# Patient Record
Sex: FEMALE | Race: WHITE | NOT HISPANIC OR LATINO | Employment: OTHER | URBAN - METROPOLITAN AREA
[De-identification: names, ages, dates, MRNs, and addresses within clinical notes are randomized per-mention and may not be internally consistent; named-entity substitution may affect disease eponyms.]

---

## 2018-05-02 ENCOUNTER — OFFICE VISIT (OUTPATIENT)
Dept: CARDIOLOGY | Facility: CLINIC | Age: 58
End: 2018-05-02

## 2018-05-02 VITALS
HEART RATE: 77 BPM | DIASTOLIC BLOOD PRESSURE: 84 MMHG | WEIGHT: 235 LBS | SYSTOLIC BLOOD PRESSURE: 164 MMHG | BODY MASS INDEX: 46.13 KG/M2 | HEIGHT: 60 IN

## 2018-05-02 DIAGNOSIS — R06.09 DOE (DYSPNEA ON EXERTION): ICD-10-CM

## 2018-05-02 DIAGNOSIS — I10 ESSENTIAL HYPERTENSION: ICD-10-CM

## 2018-05-02 DIAGNOSIS — E78.00 PURE HYPERCHOLESTEROLEMIA: ICD-10-CM

## 2018-05-02 DIAGNOSIS — I25.810 CORONARY ARTERY DISEASE INVOLVING CORONARY BYPASS GRAFT OF NATIVE HEART WITHOUT ANGINA PECTORIS: Primary | ICD-10-CM

## 2018-05-02 PROCEDURE — 99204 OFFICE O/P NEW MOD 45 MIN: CPT | Performed by: INTERNAL MEDICINE

## 2018-05-02 PROCEDURE — 93010 ELECTROCARDIOGRAM REPORT: CPT | Performed by: INTERNAL MEDICINE

## 2018-05-02 RX ORDER — NAPROXEN 500 MG/1
500 TABLET ORAL 2 TIMES DAILY WITH MEALS
COMMUNITY
End: 2019-01-09

## 2018-05-02 RX ORDER — LISINOPRIL 5 MG/1
5 TABLET ORAL DAILY
COMMUNITY
End: 2020-07-15 | Stop reason: SINTOL

## 2018-05-02 RX ORDER — ASPIRIN 81 MG/1
81 TABLET ORAL DAILY
COMMUNITY
End: 2022-03-25 | Stop reason: SDUPTHER

## 2018-05-02 RX ORDER — MELOXICAM 15 MG/1
15 TABLET ORAL DAILY
Status: ON HOLD | COMMUNITY
End: 2020-09-25

## 2018-05-02 RX ORDER — VENLAFAXINE HYDROCHLORIDE 150 MG/1
450 CAPSULE, EXTENDED RELEASE ORAL DAILY
COMMUNITY

## 2018-05-02 RX ORDER — PANTOPRAZOLE SODIUM 40 MG/1
40 TABLET, DELAYED RELEASE ORAL 2 TIMES DAILY
COMMUNITY

## 2018-05-02 RX ORDER — GLIPIZIDE 10 MG/1
10 TABLET ORAL
COMMUNITY

## 2018-05-02 RX ORDER — AMLODIPINE BESYLATE 5 MG/1
10 TABLET ORAL DAILY
COMMUNITY
End: 2022-03-25 | Stop reason: SDUPTHER

## 2018-05-02 RX ORDER — ATORVASTATIN CALCIUM 80 MG/1
80 TABLET, FILM COATED ORAL DAILY
COMMUNITY

## 2018-05-02 RX ORDER — QUETIAPINE FUMARATE 100 MG/1
100 TABLET, FILM COATED ORAL NIGHTLY
Status: ON HOLD | COMMUNITY
End: 2018-06-14

## 2018-05-02 RX ORDER — GABAPENTIN 300 MG/1
300 CAPSULE ORAL 3 TIMES DAILY
COMMUNITY
End: 2019-01-09

## 2018-05-02 NOTE — PROGRESS NOTES
Subjective:     Encounter Date:2018    Primary Care Physician: Santosh Melara DO      Patient ID: Anastasia Villalba is a 58 y.o. female.    Chief Complaint:Chest Pain (re-eval)    PROBLEM LIST:  1. Coronary artery disease:  a. In , cardiac catheterization with multivessel coronary disease, normal LVEF, cardiac catheterization by Dr. Varela.    b. 2008, coronary artery bypass grafting x 4 by Dr. Cowan, incomplete database.   c. Postoperative sternal wound infection (MRSA), status post multiple surgery debridement, as well as eventual partial sternum removal and rib removal x 4.    d. On 2014, abnormal myocardial perfusion study ready by Dr. Tripp Madrid with a small area of possible ischemia in the anterolateral wall, EF of 76%.    e. 2014, echocardiogram with normal LVEF and no significant valvular abnormalities.    f. On 2014, cardiac cath showed all widely patent grafts.  No flow-obstructive disease distal.  Normal LVEF.  2. Hypertension.   3. Dyslipidemia.   4. Diabetes with neuropathy.    5. Sleep apnea on CPAP  6. Depression.   7. GERD.  8. Anxiety.   9. Arthritis.   10. Fibromyalgia.   11. Remote right knee surgery.   12.  x 2.   13. Chronic pain syndrome.   14. Remote tobacco abuse.    15. Carpal tunnel surgery      No Known Allergies      Current Outpatient Prescriptions:   •  amLODIPine (NORVASC) 5 MG tablet, Take 5 mg by mouth Daily., Disp: , Rfl:   •  aspirin 325 MG tablet, Take 325 mg by mouth Daily., Disp: , Rfl:   •  atorvastatin (LIPITOR) 80 MG tablet, Take 80 mg by mouth Daily., Disp: , Rfl:   •  gabapentin (NEURONTIN) 300 MG capsule, Take 300 mg by mouth 3 (Three) Times a Day., Disp: , Rfl:   •  glipiZIDE (GLUCOTROL) 10 MG tablet, Take 10 mg by mouth 2 (Two) Times a Day Before Meals., Disp: , Rfl:   •  lisinopril (PRINIVIL,ZESTRIL) 5 MG tablet, Take 5 mg by mouth Daily., Disp: , Rfl:   •  meloxicam (MOBIC) 15 MG tablet, Take 15 mg by mouth  Daily., Disp: , Rfl:   •  metFORMIN (GLUCOPHAGE) 1000 MG tablet, Take 1,000 mg by mouth 2 (Two) Times a Day With Meals., Disp: , Rfl:   •  metoprolol tartrate (LOPRESSOR) 25 MG tablet, Take 25 mg by mouth 2 (Two) Times a Day., Disp: , Rfl:   •  pantoprazole (PROTONIX) 40 MG EC tablet, Take 40 mg by mouth 2 (Two) Times a Day., Disp: , Rfl:   •  QUEtiapine (SEROquel) 100 MG tablet, Take 100 mg by mouth Every Night., Disp: , Rfl:   •  venlafaxine XR (EFFEXOR-XR) 150 MG 24 hr capsule, Take 300 mg by mouth Daily., Disp: , Rfl:         History of Present Illness    Patient presents today for reevaluation of coronary artery disease, hypertension, and dyslipidemia. She recently saw her primary care and was discussing possible use of diet pills for weight loss. He wished for her to be evaluated by us first as it had been so long since she had an evaluation. She notes chronic pain that is overall unchanged. She does note that she has some occasional left elbow throbbing sensations that are similar to her previous angina. She notes that she does not present to the hospital for this due to insurance issues. No syncope or near syncope. She has chronic unchanged lower extremity edema. Does note shortness of breath with stairs. Has some occasional orthopnea symptoms.  Progressive functional class III dyspnea on exertion.    The following portions of the patient's history were reviewed and updated as appropriate: allergies, current medications, past family history, past medical history, past social history, past surgical history and problem list.    Family History   Problem Relation Age of Onset   • Heart attack Mother        Social History   Substance Use Topics   • Smoking status: Former Smoker     Quit date: 2012   • Smokeless tobacco: Never Used   • Alcohol use No         Review of Systems   Constitution: Positive for malaise/fatigue and weight gain. Negative for fever and weakness.   HENT: Positive for hearing loss. Negative  "for nosebleeds.    Eyes: Positive for blurred vision. Negative for redness and visual disturbance.   Cardiovascular: Positive for chest pain, leg swelling, orthopnea and palpitations. Negative for paroxysmal nocturnal dyspnea.   Respiratory: Positive for shortness of breath and snoring. Negative for cough, sputum production and wheezing.    Endocrine: Positive for heat intolerance and polyphagia.   Hematologic/Lymphatic: Negative for bleeding problem.   Skin: Negative for flushing, itching and rash.   Musculoskeletal: Positive for arthritis, muscle weakness and myalgias. Negative for falls, joint pain and muscle cramps.   Gastrointestinal: Positive for heartburn. Negative for abdominal pain, diarrhea, nausea and vomiting.   Genitourinary: Negative for hematuria.   Neurological: Positive for excessive daytime sleepiness, dizziness and headaches. Negative for tremors.   Psychiatric/Behavioral: Positive for depression. Negative for substance abuse. The patient is nervous/anxious.           Objective:   /84   Pulse 77   Ht 152.4 cm (60\")   Wt 107 kg (235 lb)   BMI 45.90 kg/m²          Physical Exam   Constitutional: She is oriented to person, place, and time. She appears well-developed and well-nourished.   HENT:   Head: Normocephalic and atraumatic.   Mouth/Throat: Oropharynx is clear and moist.   Eyes: Conjunctivae are normal. Pupils are equal, round, and reactive to light.   Neck: Normal carotid pulses and no JVD present. Carotid bruit is not present. No thyromegaly present.   Cardiovascular: Normal rate, regular rhythm, S1 normal and S2 normal.  Exam reveals no gallop and no friction rub.    No murmur heard.  Pulses:       Carotid pulses are 2+ on the right side, and 2+ on the left side.       Dorsalis pedis pulses are 2+ on the right side, and 2+ on the left side.        Posterior tibial pulses are 2+ on the right side, and 2+ on the left side.   Pulmonary/Chest: No respiratory distress. She has no " wheezes. She has no rales. She exhibits no tenderness.   Abdominal: She exhibits no distension, no abdominal bruit and no mass. There is no hepatosplenomegaly. There is no tenderness. There is no rebound.   Musculoskeletal: She exhibits no edema, tenderness or deformity.   Lymphadenopathy:     She has no cervical adenopathy.   Neurological: She is alert and oriented to person, place, and time. She has normal strength.   Skin: Skin is warm and dry. No rash noted. No cyanosis. Nails show no clubbing.   Psychiatric: She has a normal mood and affect. Cognition and memory are normal.         ECG 12 Lead  Date/Time: 5/2/2018 3:25 PM  Performed by: MICHELL OKEEFE  Authorized by: MICHELL OKEEFE   Rhythm: sinus rhythm  Conduction: incomplete RBBB  Comments: Nonspecific T wave abnormality                  Assessment:   Assessment/Plan      Anastasia was seen today for chest pain.    Diagnoses and all orders for this visit:    Coronary artery disease involving coronary bypass graft of native heart without angina pectoris    MARTIN (dyspnea on exertion)    Essential hypertension    Pure hypercholesterolemia    Other orders  -     ECG 12 Lead      1.  Coronary artery disease, 10 years status post coronary bypass grafting with patent grafts 3 years ago.  History of false positive GXT performed sestamibi perfusion study  2.  Diabetes plus minus controlled less) 7.3  3.  Hypertension uncontrolled  4.  Dyslipidemia controlled  5.  Morbid obesity, probable sleep apnea    Recommendations: Check a cardiac pet nuclear perfusion study at her earliest convenience.  Regarding her desire for weight loss supplements, discussed the most of these are not safe, and if she wishes can discuss with her primary care physician switching her diabetes management to a GLP1 inhibitor if he desires.  Would also recommend sleep study.  Further recommendations after the above.       Brigida QUESADA scribed portions of this dictation for  Dr. Okeefe.  I,  Erik Caal MD, personally performed the services described in this documentation as scribed by the above individual in my presence, and it is both accurate and complete    Dictated utilizing Dragon dictation

## 2018-05-18 ENCOUNTER — HOSPITAL ENCOUNTER (OUTPATIENT)
Dept: CARDIOLOGY | Facility: HOSPITAL | Age: 58
Discharge: HOME | End: 2018-05-18
Attending: INTERNAL MEDICINE | Admitting: INTERNAL MEDICINE

## 2018-05-18 DIAGNOSIS — R06.09 DOE (DYSPNEA ON EXERTION): ICD-10-CM

## 2018-05-18 DIAGNOSIS — I25.810 CORONARY ARTERY DISEASE INVOLVING CORONARY BYPASS GRAFT OF NATIVE HEART WITHOUT ANGINA PECTORIS: ICD-10-CM

## 2018-05-18 LAB
BH CV STRESS BP STAGE 1: NORMAL
BH CV STRESS BP STAGE 3: NORMAL
BH CV STRESS COMMENTS STAGE 1: NORMAL
BH CV STRESS DOSE REGADENOSON STAGE 1: 0.4
BH CV STRESS DURATION MIN STAGE 1: 0
BH CV STRESS DURATION MIN STAGE 2: 1
BH CV STRESS DURATION MIN STAGE 3: 1
BH CV STRESS DURATION MIN STAGE 4: 1
BH CV STRESS DURATION SEC STAGE 1: 10
BH CV STRESS DURATION SEC STAGE 2: 0
BH CV STRESS HR STAGE 1: 90
BH CV STRESS HR STAGE 2: 95
BH CV STRESS HR STAGE 3: 96
BH CV STRESS HR STAGE 4: 94
BH CV STRESS PROTOCOL 1: NORMAL
BH CV STRESS RECOVERY BP: NORMAL MMHG
BH CV STRESS RECOVERY HR: 92 BPM
BH CV STRESS STAGE 1: 1
BH CV STRESS STAGE 2: 2
BH CV STRESS STAGE 3: 3
BH CV STRESS STAGE 4: 4
LV EF NUC BP: 71 %
MAXIMAL PREDICTED HEART RATE: 162 BPM
PERCENT MAX PREDICTED HR: 59.26 %
STRESS BASELINE BP: NORMAL MMHG
STRESS BASELINE HR: 82 BPM
STRESS PERCENT HR: 70 %
STRESS POST PEAK BP: NORMAL MMHG
STRESS POST PEAK HR: 96 BPM
STRESS TARGET HR: 138 BPM

## 2018-05-18 PROCEDURE — 0 RUBIDIUM CHLORIDE: Performed by: INTERNAL MEDICINE

## 2018-05-18 PROCEDURE — 78492 MYOCRD IMG PET MLT RST&STRS: CPT

## 2018-05-18 PROCEDURE — A9555 RB82 RUBIDIUM: HCPCS | Performed by: INTERNAL MEDICINE

## 2018-05-18 PROCEDURE — 93017 CV STRESS TEST TRACING ONLY: CPT

## 2018-05-18 PROCEDURE — 78492 MYOCRD IMG PET MLT RST&STRS: CPT | Performed by: INTERNAL MEDICINE

## 2018-05-18 PROCEDURE — 25010000002 REGADENOSON 0.4 MG/5ML SOLUTION: Performed by: INTERNAL MEDICINE

## 2018-05-18 PROCEDURE — 93018 CV STRESS TEST I&R ONLY: CPT | Performed by: INTERNAL MEDICINE

## 2018-05-18 RX ADMIN — RUBIDIUM CHLORIDE RB-82 1 DOSE: 150 INJECTION, SOLUTION INTRAVENOUS at 08:52

## 2018-05-18 RX ADMIN — REGADENOSON 0.4 MG: 0.08 INJECTION, SOLUTION INTRAVENOUS at 09:04

## 2018-05-18 RX ADMIN — RUBIDIUM CHLORIDE RB-82 1 DOSE: 150 INJECTION, SOLUTION INTRAVENOUS at 09:05

## 2018-06-04 DIAGNOSIS — R94.39 POSITIVE CARDIAC STRESS TEST: Primary | ICD-10-CM

## 2018-06-05 PROBLEM — R94.39 POSITIVE CARDIAC STRESS TEST: Status: ACTIVE | Noted: 2018-06-05

## 2018-06-07 ENCOUNTER — PREP FOR SURGERY (OUTPATIENT)
Dept: OTHER | Facility: HOSPITAL | Age: 58
End: 2018-06-07

## 2018-06-07 DIAGNOSIS — R94.39 ABNORMAL STRESS TEST: Primary | ICD-10-CM

## 2018-06-07 RX ORDER — SODIUM CHLORIDE 0.9 % (FLUSH) 0.9 %
1-10 SYRINGE (ML) INJECTION AS NEEDED
Status: CANCELLED | OUTPATIENT
Start: 2018-06-07

## 2018-06-07 RX ORDER — ASPIRIN 325 MG
325 TABLET ORAL ONCE
Status: CANCELLED | OUTPATIENT
Start: 2018-06-07 | End: 2018-06-07

## 2018-06-07 RX ORDER — ONDANSETRON 2 MG/ML
4 INJECTION INTRAMUSCULAR; INTRAVENOUS EVERY 6 HOURS PRN
Status: CANCELLED | OUTPATIENT
Start: 2018-06-07

## 2018-06-07 RX ORDER — ACETAMINOPHEN 325 MG/1
650 TABLET ORAL EVERY 4 HOURS PRN
Status: CANCELLED | OUTPATIENT
Start: 2018-06-07

## 2018-06-07 RX ORDER — ASPIRIN 325 MG
325 TABLET, DELAYED RELEASE (ENTERIC COATED) ORAL DAILY
Status: CANCELLED | OUTPATIENT
Start: 2018-06-08

## 2018-06-07 RX ORDER — NITROGLYCERIN 0.4 MG/1
0.4 TABLET SUBLINGUAL
Status: CANCELLED | OUTPATIENT
Start: 2018-06-07

## 2018-06-14 ENCOUNTER — HOSPITAL ENCOUNTER (OUTPATIENT)
Facility: HOSPITAL | Age: 58
Setting detail: HOSPITAL OUTPATIENT SURGERY
Discharge: HOME | End: 2018-06-14
Attending: INTERNAL MEDICINE | Admitting: INTERNAL MEDICINE

## 2018-06-14 ENCOUNTER — APPOINTMENT (OUTPATIENT)
Dept: GENERAL RADIOLOGY | Facility: HOSPITAL | Age: 58
End: 2018-06-14

## 2018-06-14 VITALS
RESPIRATION RATE: 18 BRPM | OXYGEN SATURATION: 96 % | HEIGHT: 62 IN | DIASTOLIC BLOOD PRESSURE: 88 MMHG | SYSTOLIC BLOOD PRESSURE: 166 MMHG | TEMPERATURE: 97.8 F | HEART RATE: 72 BPM | BODY MASS INDEX: 43.49 KG/M2 | WEIGHT: 236.33 LBS

## 2018-06-14 DIAGNOSIS — R94.39 ABNORMAL STRESS TEST: ICD-10-CM

## 2018-06-14 DIAGNOSIS — R94.39 POSITIVE CARDIAC STRESS TEST: ICD-10-CM

## 2018-06-14 DIAGNOSIS — I10 ESSENTIAL HYPERTENSION: Primary | ICD-10-CM

## 2018-06-14 LAB
ALBUMIN SERPL-MCNC: 3.83 G/DL (ref 3.2–4.8)
ALBUMIN/GLOB SERPL: 1.3 G/DL (ref 1.5–2.5)
ALP SERPL-CCNC: 80 U/L (ref 25–100)
ALT SERPL W P-5'-P-CCNC: 19 U/L (ref 7–40)
ANION GAP SERPL CALCULATED.3IONS-SCNC: 9 MMOL/L (ref 3–11)
ARTICHOKE IGE QN: 101 MG/DL (ref 0–130)
AST SERPL-CCNC: 17 U/L (ref 0–33)
BILIRUB SERPL-MCNC: 0.2 MG/DL (ref 0.3–1.2)
BUN BLD-MCNC: 19 MG/DL (ref 9–23)
BUN/CREAT SERPL: 13.1 (ref 7–25)
CALCIUM SPEC-SCNC: 9.1 MG/DL (ref 8.7–10.4)
CHLORIDE SERPL-SCNC: 101 MMOL/L (ref 99–109)
CHOLEST SERPL-MCNC: 168 MG/DL (ref 0–200)
CO2 SERPL-SCNC: 24 MMOL/L (ref 20–31)
CREAT BLD-MCNC: 1.45 MG/DL (ref 0.6–1.3)
DEPRECATED RDW RBC AUTO: 41 FL (ref 37–54)
ERYTHROCYTE [DISTWIDTH] IN BLOOD BY AUTOMATED COUNT: 12.8 % (ref 11.3–14.5)
GFR SERPL CREATININE-BSD FRML MDRD: 37 ML/MIN/1.73
GLOBULIN UR ELPH-MCNC: 3 GM/DL
GLUCOSE BLD-MCNC: 400 MG/DL (ref 70–100)
GLUCOSE BLDC GLUCOMTR-MCNC: 406 MG/DL (ref 70–130)
HBA1C MFR BLD: 11.9 % (ref 4.8–5.6)
HCT VFR BLD AUTO: 38.3 % (ref 34.5–44)
HDLC SERPL-MCNC: 45 MG/DL (ref 40–60)
HGB BLD-MCNC: 12.1 G/DL (ref 11.5–15.5)
MCH RBC QN AUTO: 27.7 PG (ref 27–31)
MCHC RBC AUTO-ENTMCNC: 31.6 G/DL (ref 32–36)
MCV RBC AUTO: 87.6 FL (ref 80–99)
PLATELET # BLD AUTO: 383 10*3/MM3 (ref 150–450)
PMV BLD AUTO: 11.1 FL (ref 6–12)
POTASSIUM BLD-SCNC: 4.9 MMOL/L (ref 3.5–5.5)
PROT SERPL-MCNC: 6.8 G/DL (ref 5.7–8.2)
RBC # BLD AUTO: 4.37 10*6/MM3 (ref 3.89–5.14)
SODIUM BLD-SCNC: 134 MMOL/L (ref 132–146)
TRIGL SERPL-MCNC: 282 MG/DL (ref 0–150)
WBC NRBC COR # BLD: 8.42 10*3/MM3 (ref 3.5–10.8)

## 2018-06-14 PROCEDURE — 0 IOPAMIDOL PER 1 ML: Performed by: INTERNAL MEDICINE

## 2018-06-14 PROCEDURE — C1894 INTRO/SHEATH, NON-LASER: HCPCS | Performed by: INTERNAL MEDICINE

## 2018-06-14 PROCEDURE — 36415 COLL VENOUS BLD VENIPUNCTURE: CPT

## 2018-06-14 PROCEDURE — 25010000002 FENTANYL CITRATE (PF) 100 MCG/2ML SOLUTION: Performed by: INTERNAL MEDICINE

## 2018-06-14 PROCEDURE — G0108 DIAB MANAGE TRN  PER INDIV: HCPCS

## 2018-06-14 PROCEDURE — 80061 LIPID PANEL: CPT | Performed by: NURSE PRACTITIONER

## 2018-06-14 PROCEDURE — 25010000002 MIDAZOLAM PER 1 MG: Performed by: INTERNAL MEDICINE

## 2018-06-14 PROCEDURE — S0260 H&P FOR SURGERY: HCPCS | Performed by: INTERNAL MEDICINE

## 2018-06-14 PROCEDURE — 93459 L HRT ART/GRFT ANGIO: CPT | Performed by: INTERNAL MEDICINE

## 2018-06-14 PROCEDURE — 71045 X-RAY EXAM CHEST 1 VIEW: CPT

## 2018-06-14 PROCEDURE — C1769 GUIDE WIRE: HCPCS | Performed by: INTERNAL MEDICINE

## 2018-06-14 PROCEDURE — 80053 COMPREHEN METABOLIC PANEL: CPT | Performed by: NURSE PRACTITIONER

## 2018-06-14 PROCEDURE — 83036 HEMOGLOBIN GLYCOSYLATED A1C: CPT | Performed by: NURSE PRACTITIONER

## 2018-06-14 PROCEDURE — 82962 GLUCOSE BLOOD TEST: CPT

## 2018-06-14 PROCEDURE — 85027 COMPLETE CBC AUTOMATED: CPT | Performed by: NURSE PRACTITIONER

## 2018-06-14 RX ORDER — FENTANYL CITRATE 50 UG/ML
INJECTION, SOLUTION INTRAMUSCULAR; INTRAVENOUS AS NEEDED
Status: DISCONTINUED | OUTPATIENT
Start: 2018-06-14 | End: 2018-06-14 | Stop reason: HOSPADM

## 2018-06-14 RX ORDER — SODIUM CHLORIDE 9 MG/ML
1-3 INJECTION, SOLUTION INTRAVENOUS CONTINUOUS
Status: DISCONTINUED | OUTPATIENT
Start: 2018-06-14 | End: 2018-06-14 | Stop reason: HOSPADM

## 2018-06-14 RX ORDER — LISINOPRIL 5 MG/1
5 TABLET ORAL DAILY
Status: DISCONTINUED | OUTPATIENT
Start: 2018-06-14 | End: 2018-06-14 | Stop reason: HOSPADM

## 2018-06-14 RX ORDER — HYDROCODONE BITARTRATE AND ACETAMINOPHEN 5; 325 MG/1; MG/1
1 TABLET ORAL EVERY 4 HOURS PRN
Status: DISCONTINUED | OUTPATIENT
Start: 2018-06-14 | End: 2018-06-14 | Stop reason: HOSPADM

## 2018-06-14 RX ORDER — ASPIRIN 325 MG
325 TABLET, DELAYED RELEASE (ENTERIC COATED) ORAL DAILY
Status: DISCONTINUED | OUTPATIENT
Start: 2018-06-15 | End: 2018-06-14 | Stop reason: HOSPADM

## 2018-06-14 RX ORDER — SODIUM CHLORIDE 0.9 % (FLUSH) 0.9 %
1-10 SYRINGE (ML) INJECTION AS NEEDED
Status: DISCONTINUED | OUTPATIENT
Start: 2018-06-14 | End: 2018-06-14 | Stop reason: HOSPADM

## 2018-06-14 RX ORDER — NITROGLYCERIN 0.4 MG/1
0.4 TABLET SUBLINGUAL
Status: DISCONTINUED | OUTPATIENT
Start: 2018-06-14 | End: 2018-06-14 | Stop reason: HOSPADM

## 2018-06-14 RX ORDER — ZOLPIDEM TARTRATE 5 MG/1
5 TABLET ORAL NIGHTLY PRN
COMMUNITY
End: 2019-01-09

## 2018-06-14 RX ORDER — ARIPIPRAZOLE 5 MG/1
5 TABLET ORAL DAILY
COMMUNITY
End: 2019-01-09

## 2018-06-14 RX ORDER — ACETAMINOPHEN 325 MG/1
650 TABLET ORAL EVERY 4 HOURS PRN
Status: DISCONTINUED | OUTPATIENT
Start: 2018-06-14 | End: 2018-06-14 | Stop reason: HOSPADM

## 2018-06-14 RX ORDER — AMLODIPINE BESYLATE 5 MG/1
5 TABLET ORAL DAILY
Status: DISCONTINUED | OUTPATIENT
Start: 2018-06-14 | End: 2018-06-14 | Stop reason: HOSPADM

## 2018-06-14 RX ORDER — ASPIRIN 325 MG
325 TABLET ORAL ONCE
Status: COMPLETED | OUTPATIENT
Start: 2018-06-14 | End: 2018-06-14

## 2018-06-14 RX ORDER — AMOXICILLIN 875 MG/1
875 TABLET, COATED ORAL 2 TIMES DAILY
COMMUNITY
End: 2019-01-09

## 2018-06-14 RX ORDER — ONDANSETRON 2 MG/ML
4 INJECTION INTRAMUSCULAR; INTRAVENOUS EVERY 6 HOURS PRN
Status: DISCONTINUED | OUTPATIENT
Start: 2018-06-14 | End: 2018-06-14 | Stop reason: HOSPADM

## 2018-06-14 RX ORDER — MIDAZOLAM HYDROCHLORIDE 1 MG/ML
INJECTION INTRAMUSCULAR; INTRAVENOUS AS NEEDED
Status: DISCONTINUED | OUTPATIENT
Start: 2018-06-14 | End: 2018-06-14 | Stop reason: HOSPADM

## 2018-06-14 RX ORDER — LIDOCAINE HYDROCHLORIDE 10 MG/ML
INJECTION, SOLUTION INFILTRATION; PERINEURAL AS NEEDED
Status: DISCONTINUED | OUTPATIENT
Start: 2018-06-14 | End: 2018-06-14 | Stop reason: HOSPADM

## 2018-06-14 RX ORDER — SODIUM CHLORIDE 9 MG/ML
1-3 INJECTION, SOLUTION INTRAVENOUS CONTINUOUS
Status: DISCONTINUED | OUTPATIENT
Start: 2018-06-14 | End: 2018-06-14

## 2018-06-14 RX ADMIN — ASPIRIN 325 MG ORAL TABLET 325 MG: 325 PILL ORAL at 09:42

## 2018-06-14 RX ADMIN — LISINOPRIL 5 MG: 5 TABLET ORAL at 09:48

## 2018-06-14 RX ADMIN — SODIUM CHLORIDE 3 ML/KG/HR: 9 INJECTION, SOLUTION INTRAVENOUS at 09:42

## 2018-06-14 RX ADMIN — ACETAMINOPHEN 650 MG: 325 TABLET ORAL at 15:19

## 2018-06-14 RX ADMIN — METOPROLOL TARTRATE 25 MG: 25 TABLET ORAL at 09:42

## 2018-06-14 RX ADMIN — ACETAMINOPHEN 650 MG: 325 TABLET ORAL at 09:48

## 2018-06-14 RX ADMIN — AMLODIPINE BESYLATE 5 MG: 5 TABLET ORAL at 09:42

## 2018-06-14 NOTE — H&P
[facility] at [facility]   History and physical      Patient Care Team:  Santosh Melara DO as PCP - General (Internal Medicine)     PROBLEM LIST:  1. Coronary artery disease:  a. In , cardiac catheterization with multivessel coronary disease, normal LVEF, cardiac catheterization by Dr. Varela.    b. 2008, coronary artery bypass grafting x 4 by Dr. Cowan, incomplete database.   c. Postoperative sternal wound infection (MRSA), status post multiple surgery debridement, as well as eventual partial sternum removal and rib removal x 4.    d. On 2014, abnormal myocardial perfusion study ready by Dr. Tripp Madrid with a small area of possible ischemia in the anterolateral wall, EF of 76%.    e. 2014, echocardiogram with normal LVEF and no significant valvular abnormalities.    f. On 2014, cardiac cath showed all widely patent grafts.  No flow-obstructive disease.  Normal LVEF.  g. 18 myocardial perfusion study with anterior lateral ischemia.  2. Hypertension.   3. Dyslipidemia.   4. Diabetes with neuropathy.    5. Sleep apnea on CPAP  6. Depression.   7. GERD.  8. Anxiety.   9. Arthritis.   10. Fibromyalgia.   11. Remote right knee surgery.   12.  x 2.   13. Chronic pain syndrome.   14. Remote tobacco abuse.    15. Carpal tunnel surgery      No Known Allergies        Current Facility-Administered Medications:   •  acetaminophen (TYLENOL) tablet 650 mg, 650 mg, Oral, Q4H PRN, Brigida Mjeiaer, APRN  •  aspirin tablet 325 mg, 325 mg, Oral, Once **AND** [START ON 6/15/2018] aspirin EC tablet 325 mg, 325 mg, Oral, Daily, Brigida Gaston, APRN  •  nitroglycerin (NITROSTAT) SL tablet 0.4 mg, 0.4 mg, Sublingual, Q5 Min PRN, Brigida Mejiaer, APRN  •  ondansetron (ZOFRAN) injection 4 mg, 4 mg, Intravenous, Q6H PRN, Brigida Gaston, APRN  •  sodium chloride 0.9 % flush 1-10 mL, 1-10 mL, Intravenous, PRN, Brigida Mejiaer, APRN         Prescriptions Prior to  Admission   Medication Sig Dispense Refill Last Dose   • amLODIPine (NORVASC) 5 MG tablet Take 5 mg by mouth Daily.   6/13/2018 at 0900   • aspirin 325 MG tablet Take 325 mg by mouth Daily.   6/13/2018 at 0900   • atorvastatin (LIPITOR) 80 MG tablet Take 80 mg by mouth Daily.   6/13/2018 at 2100   • gabapentin (NEURONTIN) 300 MG capsule Take 300 mg by mouth 3 (Three) Times a Day.   Past Week at Unknown time   • glipiZIDE (GLUCOTROL) 10 MG tablet Take 10 mg by mouth 2 (Two) Times a Day Before Meals.   6/13/2018 at 1800   • lisinopril (PRINIVIL,ZESTRIL) 5 MG tablet Take 5 mg by mouth Daily.   6/13/2018 at 0900   • meloxicam (MOBIC) 15 MG tablet Take 15 mg by mouth Daily.   6/13/2018 at 0900   • metFORMIN (GLUCOPHAGE) 1000 MG tablet Take 1,000 mg by mouth 2 (Two) Times a Day With Meals.   6/13/2018 at 1800   • metoprolol tartrate (LOPRESSOR) 25 MG tablet Take 25 mg by mouth 2 (Two) Times a Day.   6/13/2018 at 2100   • naproxen (NAPROSYN) 500 MG tablet Take 500 mg by mouth 2 (Two) Times a Day With Meals.   6/13/2018 at 1800   • pantoprazole (PROTONIX) 40 MG EC tablet Take 40 mg by mouth 2 (Two) Times a Day.   6/13/2018 at 2100   • venlafaxine XR (EFFEXOR-XR) 150 MG 24 hr capsule Take 300 mg by mouth Daily.   6/13/2018 at 0900   • ARIPiprazole (ABILIFY) 5 MG tablet Take 5 mg by mouth Daily.   Unknown at Unknown time   • zolpidem (AMBIEN) 5 MG tablet Take 5 mg by mouth At Night As Needed for Sleep.   Unknown at Unknown time         Subjective .   History of present illness:    Patient is a 58-year-old  female who we are seeing today for cardiac catheterization secondary to abnormal stress test.  She was last seen in the office on May 2.  At that time she had noted some recurrent mild anginal symptoms.  She had not presented to the hospital for any of this.  Due to her symptoms she underwent stress testing which suggested anterior lateral ischemia.  She subsequently scheduled for cardiac catheterization.  No  "significant change in symptoms since being seen in May.      Social History     Social History   • Marital status: Single     Spouse name: N/A   • Number of children: N/A   • Years of education: N/A     Occupational History   • Not on file.     Social History Main Topics   • Smoking status: Former Smoker     Quit date: 2012   • Smokeless tobacco: Never Used   • Alcohol use No   • Drug use: No   • Sexual activity: Defer     Other Topics Concern   • Not on file     Social History Narrative   • No narrative on file     Family History   Problem Relation Age of Onset   • Heart attack Mother          Review of Systems:  Review of Systems   Constitution: Positive for malaise/fatigue. Negative for fever and weakness.   HENT: Negative for nosebleeds.    Eyes: Negative for redness and visual disturbance.   Cardiovascular: Negative for orthopnea, palpitations and paroxysmal nocturnal dyspnea.   Respiratory: Positive for snoring. Negative for cough, sputum production and wheezing.    Hematologic/Lymphatic: Negative for bleeding problem.   Skin: Negative for flushing, itching and rash.   Musculoskeletal: Positive for arthritis. Negative for falls, joint pain and muscle cramps.   Gastrointestinal: Negative for abdominal pain, diarrhea, heartburn, nausea and vomiting.   Genitourinary: Negative for hematuria.   Neurological: Negative for excessive daytime sleepiness, dizziness, headaches and tremors.   Psychiatric/Behavioral: Negative for substance abuse. The patient is not nervous/anxious.               Objective   Vitals:  Blood pressure (!) 212/91, pulse 78, temperature 97.8 °F (36.6 °C), temperature source Temporal Artery , height 157.5 cm (62\"), weight 107 kg (236 lb 5.3 oz), SpO2 97 %.       Physical Exam   Constitutional: She is oriented to person, place, and time. She appears well-developed and well-nourished. No distress.   HENT:   Head: Normocephalic and atraumatic.   Eyes: Right eye exhibits no discharge. Left eye " exhibits no discharge.   Neck: No JVD present. No tracheal deviation present.   Cardiovascular: Normal rate, regular rhythm, normal heart sounds and intact distal pulses.  Exam reveals no friction rub.    No murmur heard.  Pulmonary/Chest: Effort normal and breath sounds normal. No respiratory distress.   Abdominal: Soft. Bowel sounds are normal. There is no tenderness.   Musculoskeletal: She exhibits no edema or deformity.   Neurological: She is alert and oriented to person, place, and time.   Skin: Skin is warm and dry.            Results Review:  I reviewed the patient's new clinical results.    Results from last 7 days  Lab Units 06/14/18  0838   WBC 10*3/mm3 8.42   HEMOGLOBIN g/dL 12.1   HEMATOCRIT % 38.3   PLATELETS 10*3/mm3 383           Invalid input(s): LABALBU, PROT          No results found for: TROPONINI                Assessment/Plan     1. Abnormal cardiac PET with anterior lateral ischemia.  2. Coronary artery disease with previous coronary artery bypass grafting  3. Hypertension, has not taken medications today.  4. Dyslipidemia  5. Diabetes      Plan:    1. We'll proceed to cardiac catheterization plus or minus catheter-based intervention today.  This was discussed with the patient who verbalizes understanding and wishes to proceed.  Further recommendations to follow cardiac catheterization.      DIPAK Whitaker  06/14/18  9:00 AM  IErik have reviewed the note in full and agree with all aspects of the above including physical exam, assessment, labs and plan with changes made accordingly.     Erik Caal MD  06/14/18  9:13 AM        Dictated utilizing Dragon dictation

## 2018-06-14 NOTE — CONSULTS
"Diabetes Education  Assessment/Teaching    Patient Name:  Anastasia Villalba  YOB: 1960  MRN: [id number]  Admit Date:  6/14/2018      Assessment Date:  6/14/2018    Most Recent Value   General Information    Referral From:  Blood glucose, MD order, A1c [11.9 A1C Glucose 406]   Height  157.5 cm (62\")   Height Method  Stated   Weight  107 kg (236 lb 5.3 oz)   Weight Method  Standing scale   Pregnancy Assessment   Diabetes History   What type of diabetes do you have?  Type 2   Length of Diabetes Diagnosis  6 - 10 years   Current DM knowledge  good   Do you test your blood sugar at home?  yes   Frequency of checks  Patient states rarely checks blood glucose   Meter type  -- [Accu Chek]   Who performs the test?  self   Typical readings  greater than 200   Have you had low blood sugar? (<70mg/dl)  yes   How often do you have low blood sugar?  occasionally   Have you had high blood sugar? (>140mg/dl)  yes   How often do you have high blood sugar?  occasionally   When was your last high blood sugar?  today   How would you rate your diabetes control?  fair   How often do you check your feet?  daily   When was the last time your doctor checked your feet?  last physical -\"about 6 months ago\"   Has diabetes caused a problem in your life (work/school/family/friends, etc.)?  no   What makes it difficult for you to take care of your diabetes or yourself?  \"little motivation\"    Do you have any diabetes complications?  neuropathy, heart disease   Education Preferences   What areas of diabetes would you like to learn about?   -- [lowering A1C]   Nutrition Information   Are you currently following a special meal plan?  never   Do you eat mostly at home or out of the house?  at home   How many meals do you eat each day?  2   What is the biggest challenge you have with your diet?  Poor choices   What type of support do you currently use to help you with your health issues?  -- [family]   Assessment Topics   Healthy " Eating - Assessment  Competent   Being Active - Assessment  Competent   Taking Medication - Assessment  Needs education   Problem Solving - Assessment  Competent   Reducing Risk - Assessment  Competent   Healthy Coping - Assessment  Competent   Monitoring - Assessment  Competent   DM Goals   Healthy Eating - Goal  0-30 days from discharge   Being Active - Goal  0-30 days from discharge            Most Recent Value   DM Education Needs   Meter  Has own   Meter Type  Accuchek   Frequency of Testing  Other (comment) [rarely]   Medication  Oral   Problem Solving  Hypoglycemia, Hyperglycemia, Signs, Symptoms, Treatment   Reducing Risks  A1C testing, Blood pressure, Eye exam, Foot care, Cardiovascular, Neuropathy, Retinopathy   Physical Activity  None, Other (comment) [pt states she sleeps for activity - states she moves as little as possible]   Physical Activity Frequency  Never, Rarely   Healthy Coping  Appropriate   Discharge Plan  Home   Motivation  Not interested [pt states she isn't motivated to change because she doesn't have time]   Teaching Method  Explanation, Discussion, Handouts, Teach back   Patient Response  Verbalized understanding            Other Comments: Ms. Villalba was seen today type 2 diabetes. Discussed and taught patient about type 2 diabetes self-management, risk factors, and importance of blood glucose control to reduce complications. Target blood glucose readings and A1c goals per ADA were reviewed. Reviewed with patient current A1c and discussed its significance. Discussed in detail the effects of an elevated A1C on the cardiovascular system.  Signs, symptoms and treatment of hyperglycemia and hypoglycemia were discussed. Ms. Villalba states she has more episodes of hypoglycemia, and usually treats with candy.  We discussed 4 oz of juice, and more appropriate choices for treatment.  Lifestyle changes such as physical activity with MD approval and healthy eating were encouraged. Ms. Villalba  "states she will \"try to start getting at least 10 minutes of activity per day.\" She states she has an accu chek meter at home and is comfortable using her meter.   Patient was encouraged to keep record of blood glucose readings and to monitor more frequently to prevent hypo/hyper glycemia.  Encouraged to report readings when following up with PCP. We discussed eating 3 meals a day.  She states she only eats 2 times per day.  OP education was also encouraged for additional education once discharged. Contact information given.          Electronically signed by:  Natalie Rosales RN  06/14/18 3:00 PM  "

## 2019-01-04 NOTE — PROGRESS NOTES
Subjective:     Encounter Date:2019    Primary Care Physician: Santosh Melara DO      Patient ID: Anastasia Villalba is a 58 y.o. female.    Chief Complaint:Coronary artery disease involving coronary bypass graft of n    PROBLEM LIST:  1. Coronary artery disease:  a. In , cardiac catheterization with multivessel coronary disease, normal LVEF, cardiac catheterization by Dr. Varela.    b. 2008, coronary artery bypass grafting x 4 by Dr. Cowan, incomplete database.   c. Postoperative sternal wound infection (MRSA), status post multiple surgery debridement, as well as eventual partial sternum removal and rib removal x 4.    d. On 2014, abnormal myocardial perfusion study ready by Dr. Tripp Madrid with a small area of possible ischemia in the anterolateral wall, EF of 76%.    e. 2014, echocardiogram with normal LVEF and no significant valvular abnormalities.    f. On 2014, cardiac cath showed all widely patent grafts.  No flow-obstructive disease distal.  Normal LVEF.  g. 2018 cardiac catheterization with widely patent grafts.  Severe diffuse native vessel small vessel disease.  Not amenable to any revascularization.  EF of 50%.  2. Hypertension.   3. Dyslipidemia.   4. Diabetes with neuropathy.    5. Sleep apnea on CPAP  6. Depression.   7. GERD.  8. Anxiety.   9. Arthritis.   10. Fibromyalgia.   11. Remote right knee surgery.   12.  x 2.   13. Chronic pain syndrome.   14. Remote tobacco abuse.    15. Carpal tunnel surgery      No Known Allergies      Current Outpatient Medications:   •  amLODIPine (NORVASC) 5 MG tablet, Take 5 mg by mouth Daily., Disp: , Rfl:   •  aspirin 325 MG tablet, Take 325 mg by mouth Daily., Disp: , Rfl:   •  atorvastatin (LIPITOR) 80 MG tablet, Take 80 mg by mouth Daily., Disp: , Rfl:   •  glipiZIDE (GLUCOTROL) 10 MG tablet, Take 10 mg by mouth 2 (Two) Times a Day Before Meals., Disp: , Rfl:   •  lisinopril (PRINIVIL,ZESTRIL) 5 MG tablet,  Take 5 mg by mouth Daily., Disp: , Rfl:   •  meloxicam (MOBIC) 15 MG tablet, Take 15 mg by mouth Daily., Disp: , Rfl:   •  metFORMIN (GLUCOPHAGE) 1000 MG tablet, Take 1,000 mg by mouth 2 (Two) Times a Day With Meals., Disp: , Rfl:   •  metoprolol tartrate (LOPRESSOR) 25 MG tablet, Take 25 mg by mouth 2 (Two) Times a Day., Disp: , Rfl:   •  pantoprazole (PROTONIX) 40 MG EC tablet, Take 40 mg by mouth 2 (Two) Times a Day., Disp: , Rfl:   •  venlafaxine XR (EFFEXOR-XR) 150 MG 24 hr capsule, Take 300 mg by mouth Daily., Disp: , Rfl:         History of Present Illness    Patient returns today at the request of her primary care physician for complaints of chest pain.  She has noted coronary artery disease.  General cardiac catheterization in  of this year which showed patent grafts but severe diffuse small vessel disease.  She notes over the last few months some recurrent episodes of chest pain.  These are typically occurring at rest.  Not necessarily brought on with physical activity.  She was given a prescription for sibling one nitroglycerin which will alleviate her pain.  She notes that one day she had chest pain which was identical to her previous anginal symptoms.  No reported syncope, near-syncope, or edema.  She notes that her glucose levels have been elevated recently.  Notes not previously being on Plavix therapy.  She saw her primary care physician after having the symptoms similar to her previous angina/heart attack.  Labs were obtained with negative troponin.  Most recent LDL in 2018 was 98.  Hemoglobin A1c in 2018 was greater than 14.    The following portions of the patient's history were reviewed and updated as appropriate: allergies, current medications, past family history, past medical history, past social history, past surgical history and problem list.    Social History     Tobacco Use   • Smoking status: Former Smoker     Last attempt to quit:      Years since quittin.0  "  • Smokeless tobacco: Never Used   Substance Use Topics   • Alcohol use: No   • Drug use: No         Review of Systems   Constitution: Positive for chills, diaphoresis, weakness and malaise/fatigue.   Cardiovascular: Positive for chest pain.   Respiratory: Positive for snoring and wheezing.    Endocrine: Positive for cold intolerance, heat intolerance and polydipsia.   Hematologic/Lymphatic: Negative for bleeding problem. Bruises/bleeds easily.   Skin: Positive for dry skin and itching. Negative for rash.   Musculoskeletal: Positive for arthritis, back pain, falls, joint pain, muscle cramps and myalgias. Negative for muscle weakness.   Gastrointestinal: Negative for heartburn, nausea and vomiting.   Neurological: Positive for difficulty with concentration.   Psychiatric/Behavioral: The patient is nervous/anxious.    Allergic/Immunologic: Positive for environmental allergies.          Objective:    height is 152.4 cm (60\") and weight is 101 kg (223 lb). Her blood pressure is 134/80 and her pulse is 69.         Physical Exam   Constitutional: She is oriented to person, place, and time. She appears well-developed and well-nourished.   Morbidly obese   Neck: No JVD present. No tracheal deviation present.   Cardiovascular: Normal rate, regular rhythm and normal heart sounds. Exam reveals no friction rub.   No murmur heard.  Pulmonary/Chest: Breath sounds normal. No respiratory distress.   Abdominal: Soft. Bowel sounds are normal. There is no tenderness.   Musculoskeletal: She exhibits no edema or deformity.   Neurological: She is alert and oriented to person, place, and time.   Skin: Skin is warm and dry.       Procedures          Assessment:   Assessment/Plan      Anastasia was seen today for coronary artery disease involving coronary bypass graft of n.    Diagnoses and all orders for this visit:    Coronary artery disease involving native coronary artery of native heart with other form of angina pectoris " (CMS/[facility])    Essential hypertension    Pure hypercholesterolemia      Plan:  1. Agree with sublingual nitroglycerin.  Instructed patient to continue use as needed.  Only using a couple times weekly.  2. Start Plavix 75 mg daily and decrease aspirin to 81 mg daily.  3. Discussed the patient's case with Dr. Melara and discussed the possibility of beginning Jardiance therapy.  4. Needs much better control of diabetes.  5. If has recurrent exertional angina can consider Ranexa  6. Follow-up in 6 months or sooner if needed.     Dictation scribed for Dr. Caal by Brigida QUESADA I, Erik Caal MD, personally performed the services described in this documentation as scribed by the above individual in my presence, and it is both accurate and complete    Dictated utilizing Dragon dictation

## 2019-01-09 ENCOUNTER — OFFICE VISIT (OUTPATIENT)
Dept: CARDIOLOGY | Facility: CLINIC | Age: 59
End: 2019-01-09

## 2019-01-09 VITALS
SYSTOLIC BLOOD PRESSURE: 134 MMHG | HEART RATE: 69 BPM | WEIGHT: 223 LBS | HEIGHT: 60 IN | BODY MASS INDEX: 43.78 KG/M2 | DIASTOLIC BLOOD PRESSURE: 80 MMHG

## 2019-01-09 DIAGNOSIS — I25.118 CORONARY ARTERY DISEASE INVOLVING NATIVE CORONARY ARTERY OF NATIVE HEART WITH OTHER FORM OF ANGINA PECTORIS (HCC): Primary | ICD-10-CM

## 2019-01-09 DIAGNOSIS — I10 ESSENTIAL HYPERTENSION: ICD-10-CM

## 2019-01-09 DIAGNOSIS — E78.00 PURE HYPERCHOLESTEROLEMIA: ICD-10-CM

## 2019-01-09 PROCEDURE — 99214 OFFICE O/P EST MOD 30 MIN: CPT | Performed by: INTERNAL MEDICINE

## 2019-01-09 RX ORDER — CLOPIDOGREL BISULFATE 75 MG/1
75 TABLET ORAL DAILY
Qty: 90 TABLET | Refills: 3 | Status: SHIPPED | OUTPATIENT
Start: 2019-01-09 | End: 2019-10-24 | Stop reason: SDUPTHER

## 2019-06-07 ENCOUNTER — TELEPHONE (OUTPATIENT)
Dept: CARDIOLOGY | Facility: CLINIC | Age: 59
End: 2019-06-07

## 2019-06-07 NOTE — TELEPHONE ENCOUNTER
Patient called to report Dr. Saucedo and Dr. Melara told her to ask Dr. Caal if it's ok to have carpal tunnel surgery and if she can hold blood thinners.

## 2019-06-26 ENCOUNTER — TELEPHONE (OUTPATIENT)
Dept: CARDIOLOGY | Facility: CLINIC | Age: 59
End: 2019-06-26

## 2019-06-26 NOTE — TELEPHONE ENCOUNTER
Chandrika in [facility] at [facility] called because patient is have carpal tunnel surgery on Friday. Per Brigida on 06/07/19 pt may hold plavix for 5 days and stay on ASA. Pt was notified but a cardiac clearance letter was never sent. I sent a letter to fax [phone] with Brigida's orders.

## 2019-07-10 ENCOUNTER — OFFICE VISIT (OUTPATIENT)
Dept: CARDIOLOGY | Facility: CLINIC | Age: 59
End: 2019-07-10

## 2019-07-10 VITALS
HEART RATE: 84 BPM | WEIGHT: 207 LBS | DIASTOLIC BLOOD PRESSURE: 88 MMHG | SYSTOLIC BLOOD PRESSURE: 152 MMHG | HEIGHT: 60 IN | BODY MASS INDEX: 40.64 KG/M2 | OXYGEN SATURATION: 97 %

## 2019-07-10 DIAGNOSIS — E78.00 PURE HYPERCHOLESTEROLEMIA: ICD-10-CM

## 2019-07-10 DIAGNOSIS — I25.810 CORONARY ARTERY DISEASE INVOLVING CORONARY BYPASS GRAFT OF NATIVE HEART WITHOUT ANGINA PECTORIS: Primary | ICD-10-CM

## 2019-07-10 DIAGNOSIS — I10 ESSENTIAL HYPERTENSION: ICD-10-CM

## 2019-07-10 PROCEDURE — 99214 OFFICE O/P EST MOD 30 MIN: CPT | Performed by: INTERNAL MEDICINE

## 2019-07-10 NOTE — PROGRESS NOTES
Subjective:     Encounter Date:07/10/2019    Primary Care Physician: Santosh Melara DO      Patient ID: Anastasia Villalba is a 59 y.o. female.    Chief Complaint:Follow-up    PROBLEM LIST:  1. Coronary artery disease:  a. In , cardiac catheterization with multivessel coronary disease, normal LVEF, cardiac catheterization by Dr. Varela.    b. 2008, coronary artery bypass grafting x 4 by Dr. Cowan, incomplete database.   c. Postoperative sternal wound infection (MRSA), status post multiple surgery debridement, as well as eventual partial sternum removal and rib removal x 4.    d. On 2014, abnormal myocardial perfusion study ready by Dr. Tripp Madrid with a small area of possible ischemia in the anterolateral wall, EF of 76%.    e. 2014, echocardiogram with normal LVEF and no significant valvular abnormalities.    f. On 2014, cardiac cath showed all widely patent grafts.  No flow-obstructive disease distal.  Normal LVEF.  g. 2018 cardiac catheterization with widely patent grafts.  Severe diffuse native vessel small vessel disease.  Not amenable to any revascularization.  EF of 50%.  2. Hypertension.   3. Dyslipidemia.   4. Diabetes with neuropathy.    5. Sleep apnea on CPAP  6. Depression.   7. GERD.  8. Anxiety.   9. Arthritis.   10. Fibromyalgia.   11. Remote right knee surgery.   12.  x 2.   13. Chronic pain syndrome.   14. Remote tobacco abuse.    15. Carpal tunnel surgery      No Known Allergies      Current Outpatient Medications:   •  amLODIPine (NORVASC) 5 MG tablet, Take 5 mg by mouth Daily., Disp: , Rfl:   •  aspirin 325 MG tablet, Take 325 mg by mouth Daily., Disp: , Rfl:   •  atorvastatin (LIPITOR) 80 MG tablet, Take 80 mg by mouth Daily., Disp: , Rfl:   •  clopidogrel (PLAVIX) 75 MG tablet, Take 1 tablet by mouth Daily., Disp: 90 tablet, Rfl: 3  •  glipiZIDE (GLUCOTROL) 10 MG tablet, Take 10 mg by mouth 2 (Two) Times a Day Before Meals., Disp: , Rfl:   •   lisinopril (PRINIVIL,ZESTRIL) 5 MG tablet, Take 5 mg by mouth Daily., Disp: , Rfl:   •  meloxicam (MOBIC) 15 MG tablet, Take 15 mg by mouth Daily., Disp: , Rfl:   •  metFORMIN (GLUCOPHAGE) 1000 MG tablet, Take 1,000 mg by mouth 2 (Two) Times a Day With Meals., Disp: , Rfl:   •  metoprolol tartrate (LOPRESSOR) 25 MG tablet, Take 25 mg by mouth 2 (Two) Times a Day., Disp: , Rfl:   •  pantoprazole (PROTONIX) 40 MG EC tablet, Take 40 mg by mouth 2 (Two) Times a Day., Disp: , Rfl:   •  venlafaxine XR (EFFEXOR-XR) 150 MG 24 hr capsule, Take 450 mg by mouth Daily., Disp: , Rfl:         History of Present Illness    Patient returns today for valuation of coronary artery disease.  Since our last visit she had been doing well until proximal month ago when she noted some substernal chest heaviness which is nitroglycerin responsive.  She had a severe episode approximately 6 to 8 weeks ago which took 3 nitroglycerin for relief.  She has had other episodes responding to one nitroglycerin.  These have never been with exertion.  They are always relieved with nitro.  And with exertion she has no difficulties.  She is preop for carpal tunnel surgery in this fall.  Her hemoglobins A1c which was 14 6 months ago was dropped to 10.  Her LDL is still elevated however.  Denies dyspnea presyncope or syncope.    The following portions of the patient's history were reviewed and updated as appropriate: allergies, current medications, past family history, past medical history, past social history, past surgical history and problem list.      Social History     Tobacco Use   • Smoking status: Former Smoker     Last attempt to quit: 2012     Years since quittin.5   • Smokeless tobacco: Never Used   Substance Use Topics   • Alcohol use: No   • Drug use: No         Review of Systems   Constitution: Positive for weakness and malaise/fatigue.   HENT: Positive for ear pain and hearing loss.    Cardiovascular: Negative.    Respiratory: Positive for  "snoring.    Endocrine: Positive for heat intolerance and polydipsia.   Hematologic/Lymphatic: Negative for bleeding problem. Does not bruise/bleed easily.   Skin: Negative for rash.   Musculoskeletal: Positive for arthritis, back pain, falls and neck pain. Negative for muscle weakness and myalgias.   Gastrointestinal: Negative for heartburn, nausea and vomiting.   Neurological: Positive for excessive daytime sleepiness, dizziness and numbness.          Objective:    height is 152.4 cm (60\") and weight is 93.9 kg (207 lb). Her blood pressure is 152/88 and her pulse is 84. Her oxygen saturation is 97%.         Physical Exam   Constitutional: She is oriented to person, place, and time. She appears well-developed and well-nourished.   HENT:   Mouth/Throat: Oropharynx is clear and moist.   Neck: No JVD present. Carotid bruit is not present. No thyromegaly present.   Cardiovascular: Regular rhythm, S1 normal, S2 normal, normal heart sounds and intact distal pulses. Exam reveals no gallop, no S3 and no S4.   No murmur heard.  Pulses:       Carotid pulses are 2+ on the right side, and 2+ on the left side.       Radial pulses are 2+ on the right side, and 2+ on the left side.   Pulmonary/Chest: Breath sounds normal.   Abdominal: Soft. Bowel sounds are normal. She exhibits no mass. There is no tenderness.   Musculoskeletal: She exhibits no edema.   Neurological: She is alert and oriented to person, place, and time.   Skin: Skin is warm and dry. No rash noted.       Procedures          Assessment:   Assessment/Plan      Anastasia was seen today for follow-up.    Diagnoses and all orders for this visit:    Coronary artery disease involving coronary bypass graft of native heart without angina pectoris    Essential hypertension    Pure hypercholesterolemia      1.  Coronary artery disease, cardiac cath last year shows diffuse small vessel disease, quite severe with widely patent grafts.  She has some intermittent resting angina due " to this.  We will continue dual antiplatelet therapy, high-dose statin, and antianginals of metoprolol and amlodipine.  This is not amenable to revascularization.  2.  Dyslipidemia LDL still of 102 on current max tolerated statin.  Will attempt to add PCSK9 inhibitor.  3.  Hypertension, slightly elevated today but has been well controlled.  Will follow with primary care physician, may need to further push beta-blocker dose.  4.  Preop carpal tunnel syndrome, would be slightly elevated (moderate) risk, which is nonmodifiable.  May hold antiplatelet therapy for this.     Erik Caal MD      Dictated utilizing Dragon dictation

## 2019-08-02 ENCOUNTER — TELEPHONE (OUTPATIENT)
Dept: CARDIOLOGY | Facility: CLINIC | Age: 59
End: 2019-08-02

## 2019-08-02 NOTE — TELEPHONE ENCOUNTER
Left VM advising patient per [facility], she was going to pursue [employer] ready assistance due to co pay being unaffordable

## 2019-10-25 RX ORDER — CLOPIDOGREL BISULFATE 75 MG/1
TABLET ORAL
Qty: 90 TABLET | Refills: 3 | Status: SHIPPED | OUTPATIENT
Start: 2019-10-25 | End: 2020-07-28

## 2020-07-15 ENCOUNTER — OFFICE VISIT (OUTPATIENT)
Dept: CARDIOLOGY | Facility: CLINIC | Age: 60
End: 2020-07-15

## 2020-07-15 VITALS
HEIGHT: 60 IN | SYSTOLIC BLOOD PRESSURE: 130 MMHG | WEIGHT: 206 LBS | DIASTOLIC BLOOD PRESSURE: 80 MMHG | BODY MASS INDEX: 40.44 KG/M2 | HEART RATE: 78 BPM

## 2020-07-15 DIAGNOSIS — E78.00 PURE HYPERCHOLESTEROLEMIA: ICD-10-CM

## 2020-07-15 DIAGNOSIS — I10 ESSENTIAL HYPERTENSION: ICD-10-CM

## 2020-07-15 DIAGNOSIS — I25.118 CORONARY ARTERY DISEASE INVOLVING NATIVE CORONARY ARTERY OF NATIVE HEART WITH OTHER FORM OF ANGINA PECTORIS (HCC): Primary | ICD-10-CM

## 2020-07-15 PROCEDURE — 99214 OFFICE O/P EST MOD 30 MIN: CPT | Performed by: NURSE PRACTITIONER

## 2020-07-15 RX ORDER — VALSARTAN 160 MG/1
160 TABLET ORAL DAILY
Status: ON HOLD | COMMUNITY
End: 2020-09-25

## 2020-07-15 NOTE — PROGRESS NOTES
Subjective:     Encounter Date:07/15/2020    Primary Care Physician: Santosh Melara DO      Patient ID: Anastasia Villalba is a 60 y.o. female.    Chief Complaint:Follow-up    PROBLEM LIST:  1. Coronary artery disease:  a. In , cardiac catheterization with multivessel coronary disease, normal LVEF, cardiac catheterization by Dr. Varela.    b. 2008, coronary artery bypass grafting x 4 by Dr. Cowan, incomplete database.   c. Postoperative sternal wound infection (MRSA), status post multiple surgery debridement, as well as eventual partial sternum removal and rib removal x 4.    d. On 2014, abnormal myocardial perfusion study ready by Dr. Tripp Madrid with a small area of possible ischemia in the anterolateral wall, EF of 76%.    e. 2014, echocardiogram with normal LVEF and no significant valvular abnormalities.    f. On 2014, cardiac cath showed all widely patent grafts.  No flow-obstructive disease distal.  Normal LVEF.  g. 2018 cardiac catheterization with widely patent grafts.  Severe diffuse native vessel small vessel disease.  Not amenable to any revascularization.  EF of 50%.  2. Hypertension.   3. Dyslipidemia.   4. Diabetes with neuropathy.    5. Sleep apnea on CPAP  6. Depression.   7. GERD.  8. Anxiety.   9. Arthritis.   10. Fibromyalgia.   11. Remote right knee surgery.   12.  x 2.   13. Chronic pain syndrome.   14. Remote tobacco abuse.    15. Carpal tunnel surgery      No Known Allergies      Current Outpatient Medications:   •  amLODIPine (NORVASC) 5 MG tablet, Take 5 mg by mouth Daily., Disp: , Rfl:   •  aspirin 325 MG tablet, Take 325 mg by mouth Daily., Disp: , Rfl:   •  atorvastatin (LIPITOR) 80 MG tablet, Take 80 mg by mouth Daily., Disp: , Rfl:   •  clopidogrel (PLAVIX) 75 MG tablet, TAKE 1 TABLET EVERY DAY, Disp: 90 tablet, Rfl: 3  •  Evolocumab 140 MG/ML solution auto-injector, Inject 1 mL under the skin into the appropriate area as directed  Every 14 (Fourteen) Days., Disp: 6 pen, Rfl: 3  •  glipiZIDE (GLUCOTROL) 10 MG tablet, Take 10 mg by mouth 2 (Two) Times a Day Before Meals., Disp: , Rfl:   •  meloxicam (MOBIC) 15 MG tablet, Take 15 mg by mouth Daily., Disp: , Rfl:   •  metFORMIN (GLUCOPHAGE) 1000 MG tablet, Take 1,000 mg by mouth 2 (Two) Times a Day With Meals., Disp: , Rfl:   •  metoprolol tartrate (LOPRESSOR) 25 MG tablet, Take 25 mg by mouth 2 (Two) Times a Day., Disp: , Rfl:   •  pantoprazole (PROTONIX) 40 MG EC tablet, Take 40 mg by mouth 2 (Two) Times a Day., Disp: , Rfl:   •  valsartan (DIOVAN) 160 MG tablet, Take 160 mg by mouth Daily., Disp: , Rfl:   •  venlafaxine XR (EFFEXOR-XR) 150 MG 24 hr capsule, Take 450 mg by mouth Daily., Disp: , Rfl:         History of Present Illness    Patient is a 60 year old  female who is being seen today for annual follow-up of coronary artery disease. Since last being seen she has had some HTN with adjustment in medications. She is also being referred to nephrology for further evaluation of proteinuria. She has some occasional chest pain, does not note that she has increase of symptoms. Had a significant episode of chest pain about 7 months ago. She saw her PCP who did an EKG with no acute changes. Since then she has her usual chronic symptoms. Lisinopril was changed to Diovan for BP control and her BP is improved. She is overall limited by her arthritis.    The following portions of the patient's history were reviewed and updated as appropriate: allergies, current medications, past family history, past medical history, past social history, past surgical history and problem list.      Social History     Tobacco Use   • Smoking status: Former Smoker     Last attempt to quit: 2012     Years since quittin.5   • Smokeless tobacco: Never Used   Substance Use Topics   • Alcohol use: No   • Drug use: No         Review of Systems   Constitution: Positive for diaphoresis and malaise/fatigue.  "  Cardiovascular: Positive for dyspnea on exertion and leg swelling. Negative for chest pain, palpitations and syncope.   Respiratory: Negative.  Negative for shortness of breath.    Endocrine: Positive for polyphagia.   Hematologic/Lymphatic: Negative for bleeding problem. Bruises/bleeds easily.   Skin: Negative for rash.   Musculoskeletal: Positive for arthritis, back pain, muscle weakness, myalgias and neck pain.   Gastrointestinal: Negative for heartburn, nausea and vomiting.   Neurological: Positive for excessive daytime sleepiness and loss of balance. Negative for dizziness, light-headedness and numbness.   Psychiatric/Behavioral: The patient is nervous/anxious.           Objective:   /80 (BP Location: Right arm)   Pulse 78   Ht 152.4 cm (60\")   Wt 93.4 kg (206 lb)   BMI 40.23 kg/m²         Physical Exam   Constitutional: She is oriented to person, place, and time. She appears well-developed and well-nourished.   Neck: No JVD present. No tracheal deviation present.   Cardiovascular: Normal rate, regular rhythm and normal heart sounds. Exam reveals no friction rub.   No murmur heard.  Decreased pedal pulses   Pulmonary/Chest: Effort normal and breath sounds normal. No respiratory distress.   Abdominal: Bowel sounds are normal. There is no tenderness.   Musculoskeletal: She exhibits no edema or deformity.   Neurological: She is alert and oriented to person, place, and time.   Skin: Skin is warm and dry.       Procedures          Assessment:   Assessment/Plan      Anastasia was seen today for follow-up.    Diagnoses and all orders for this visit:    Coronary artery disease involving native coronary artery of native heart with other form of angina pectoris (CMS/HCC). Stable on ASA.    Essential hypertension, controlled on diovan.    Pure hypercholesterolemia, on statin and PCSK9 inhibitor      Plan:  1. Decrease ASA to 81 mg daily  2. Continue other current medications.  3. Follow-up in 1 years time or " sooner if needed       Brigida QUESADA     Dictated utilizing Dragon dictation

## 2020-07-28 RX ORDER — CLOPIDOGREL BISULFATE 75 MG/1
TABLET ORAL
Qty: 90 TABLET | Refills: 3 | Status: SHIPPED | OUTPATIENT
Start: 2020-07-28 | End: 2022-03-25 | Stop reason: SDUPTHER

## 2020-09-23 ENCOUNTER — OFFICE VISIT (OUTPATIENT)
Dept: CARDIOLOGY | Facility: CLINIC | Age: 60
End: 2020-09-23

## 2020-09-23 VITALS
SYSTOLIC BLOOD PRESSURE: 114 MMHG | BODY MASS INDEX: 42.8 KG/M2 | HEART RATE: 80 BPM | DIASTOLIC BLOOD PRESSURE: 58 MMHG | HEIGHT: 60 IN | WEIGHT: 218 LBS

## 2020-09-23 DIAGNOSIS — E78.00 PURE HYPERCHOLESTEROLEMIA: ICD-10-CM

## 2020-09-23 DIAGNOSIS — I10 ESSENTIAL HYPERTENSION: ICD-10-CM

## 2020-09-23 DIAGNOSIS — R09.89 BILATERAL CAROTID BRUITS: ICD-10-CM

## 2020-09-23 DIAGNOSIS — R06.09 DOE (DYSPNEA ON EXERTION): ICD-10-CM

## 2020-09-23 DIAGNOSIS — I25.810 CORONARY ARTERY DISEASE INVOLVING CORONARY BYPASS GRAFT OF NATIVE HEART WITHOUT ANGINA PECTORIS: Primary | ICD-10-CM

## 2020-09-23 PROCEDURE — 99214 OFFICE O/P EST MOD 30 MIN: CPT | Performed by: INTERNAL MEDICINE

## 2020-09-23 RX ORDER — SEMAGLUTIDE 1.34 MG/ML
1 INJECTION, SOLUTION SUBCUTANEOUS WEEKLY
COMMUNITY

## 2020-09-23 NOTE — PROGRESS NOTES
Subjective:     Encounter Date:2020    Primary Care Physician: Santosh Melara DO      Patient ID: Anastasia Villalba is a 60 y.o. female.    Chief Complaint:Follow-up    PROBLEM LIST:  1. Coronary artery disease:  a. , [facility] with multivessel coronary disease, normal LVEF, cardiac catheterization by Dr. Varela.  b. 2008, coronary artery bypass grafting x 4 by Dr. Cowan, incomplete database.   c. Postoperative sternal wound infection (MRSA), status post multiple surgery debridement, as well as eventual partial sternum removal and rib removal x 4.    d. On 2014, abnormal myocardial perfusion study ready by Dr. Tripp Madrid with a small area of possible ischemia in the anterolateral wall, EF of 76%.    e. 2014, echocardiogram with normal LVEF and no significant valvular abnormalities.    f. On 2014, cardiac cath showed all widely patent grafts.  No flow-obstructive disease distal.  Normal LVEF.  g. 2018 cardiac catheterization with widely patent grafts.  Severe diffuse native vessel small vessel disease.  Not amenable to any revascularization.  EF of 50%.  2. Hypertension.   3. Dyslipidemia.   4. Diabetes with neuropathy.    5. CKD  a. Renal duplex suggesting bilateral renal artery stenosis  6. Sleep apnea on CPAP  7. Depression.   8. GERD.  9. Anxiety.   10. Arthritis.   11. Fibromyalgia.   12. Remote right knee surgery.   13.  x 2.   14. Chronic pain syndrome.   15. Remote tobacco abuse.    16. Carpal tunnel surgery      No Known Allergies      Current Outpatient Medications:   •  amLODIPine (NORVASC) 5 MG tablet, Take 5 mg by mouth Daily., Disp: , Rfl:   •  aspirin (aspirin) 81 MG EC tablet, Take 81 mg by mouth Daily., Disp: , Rfl:   •  atorvastatin (LIPITOR) 80 MG tablet, Take 80 mg by mouth Daily., Disp: , Rfl:   •  clopidogrel (PLAVIX) 75 MG tablet, TAKE 1 TABLET EVERY DAY, Disp: 90 tablet, Rfl: 3  •  DOXAZOSIN MESYLATE PO, Take  by mouth Daily., Disp: , Rfl:    •  glipiZIDE (GLUCOTROL) 10 MG tablet, Take 10 mg by mouth 2 (Two) Times a Day Before Meals., Disp: , Rfl:   •  ISOSORBIDE DINITRATE PO, Take  by mouth Daily., Disp: , Rfl:   •  metoprolol tartrate (LOPRESSOR) 25 MG tablet, Take 25 mg by mouth 2 (Two) Times a Day., Disp: , Rfl:   •  pantoprazole (PROTONIX) 40 MG EC tablet, Take 40 mg by mouth 2 (Two) Times a Day., Disp: , Rfl:   •  Semaglutide,0.25 or 0.5MG/DOS, (Ozempic, 0.25 or 0.5 MG/DOSE,) 2 MG/1.5ML solution pen-injector, Inject 1 mg under the skin into the appropriate area as directed 1 (One) Time Per Week., Disp: , Rfl:   •  SITagliptin (JANUVIA) 50 MG tablet, Take 50 mg by mouth 2 (two) times a day., Disp: , Rfl:   •  venlafaxine XR (EFFEXOR-XR) 150 MG 24 hr capsule, Take 450 mg by mouth Daily., Disp: , Rfl:   •  Evolocumab 140 MG/ML solution auto-injector, Inject 1 mL under the skin into the appropriate area as directed Every 14 (Fourteen) Days., Disp: 6 pen, Rfl: 3  •  meloxicam (MOBIC) 15 MG tablet, Take 15 mg by mouth Daily., Disp: , Rfl:   •  metFORMIN (GLUCOPHAGE) 1000 MG tablet, Take 1,000 mg by mouth 2 (Two) Times a Day With Meals., Disp: , Rfl:   •  valsartan (DIOVAN) 160 MG tablet, Take 160 mg by mouth Daily., Disp: , Rfl:         History of Present Illness    Patient returns today for further evaluation of chest pain and dyspnea.  Patient has known coronary disease.  Since her last visit she is under renal artery duplex study which suggested renal artery stenosis, bilateral.  Her primary nephrologist is wishing that she undergo CO2 renal angiograms.  Over the last 2 weeks she has noted the onset of severe substernal chest pain radiating to bilateral jaws that occurs with exertion but occasionally at rest.  She is unable to do anything such as walking into the lobby today, or walking through her house without having the symptoms.  They are usually relieved with rest.  Denies syncope presyncope tachypalpitations or claudication.    The following  "portions of the patient's history were reviewed and updated as appropriate: allergies, current medications, past family history, past medical history, past social history, past surgical history and problem list.      Social History     Tobacco Use   • Smoking status: Former Smoker     Quit date:      Years since quittin.7   • Smokeless tobacco: Never Used   Substance Use Topics   • Alcohol use: No   • Drug use: No         Review of Systems   Constitution: Positive for malaise/fatigue.   HENT: Positive for hearing loss.    Cardiovascular: Positive for chest pain and dyspnea on exertion. Negative for leg swelling, palpitations and syncope.   Respiratory: Positive for shortness of breath and snoring.    Hematologic/Lymphatic: Negative for bleeding problem. Bruises/bleeds easily.   Skin: Negative for rash.   Musculoskeletal: Positive for arthritis, back pain, falls, joint pain, joint swelling, muscle weakness and neck pain. Negative for myalgias.   Gastrointestinal: Positive for change in bowel habit and diarrhea. Negative for heartburn, nausea and vomiting.   Neurological: Positive for weakness. Negative for dizziness, light-headedness, loss of balance and numbness.          Objective:   /58 (BP Location: Left arm)   Pulse 80   Ht 152.4 cm (60\")   Wt 98.9 kg (218 lb)   BMI 42.58 kg/m²         Vitals signs reviewed.   Constitutional:       Appearance: Well-developed and not in distress.   Neck:      Thyroid: No thyromegaly.      Vascular: No carotid bruit or JVD.   Pulmonary:      Breath sounds: Normal breath sounds.   Cardiovascular:      Regular rhythm.      No gallop. No S3 and S4 gallop.   Pulses:     Carotid: 2+ bilaterally with bruit on the right.     Dorsalis pedis: 2+ bilaterally.     Posterior tibial: 2+ bilaterally.  Abdominal:      General: Bowel sounds are normal.      Palpations: Abdomen is soft. There is no abdominal mass.      Tenderness: There is no abdominal tenderness. "   Musculoskeletal:         General: No deformity.      Extremities: No clubbing present.  Skin:     General: Skin is warm and dry.      Findings: No rash.   Neurological:      Mental Status: Alert and oriented to person, place, and time.         Procedures          Assessment:   Assessment/Plan      Anastasia was seen today for follow-up.    Diagnoses and all orders for this visit:    Coronary artery disease involving coronary bypass graft of native heart without angina pectoris  -     Case Request Cath Lab: Left Heart Cath, Renal Arteriogram    Pure hypercholesterolemia    Essential hypertension      1.  Coronary artery disease, known small vessel disease by cath 2 years ago.  Now with acute coronary syndrome, new onset functional class III-4 angina.  2.  Chronic renal disease.  Renal duplex suggesting bilateral renal artery stenosis.  3.  Right carotid bruit  4.  Hypertension, currently well controlled  5.  Dyslipidemia on high-dose statin and PCSK9 inhibitor    Recommendations  1.  Discussed options with patient.  Given her unstable angina needs coronary angiography (no left ventriculogram to save contrast).  2.  At the time of her cardiac cath we will perform CO2 renal angiography  3.  Check carotid duplex for bruit  4.  Check echocardiogram to evaluate LVEF  5.  Further recommendations after the above        Erik Caal MD    Dictated utilizing Dragon dictation

## 2020-09-25 ENCOUNTER — APPOINTMENT (OUTPATIENT)
Dept: GENERAL RADIOLOGY | Facility: HOSPITAL | Age: 60
End: 2020-09-25

## 2020-09-25 ENCOUNTER — HOSPITAL ENCOUNTER (OUTPATIENT)
Facility: HOSPITAL | Age: 60
Discharge: HOME | End: 2020-09-26
Attending: INTERNAL MEDICINE | Admitting: INTERNAL MEDICINE

## 2020-09-25 DIAGNOSIS — I25.810 CORONARY ARTERY DISEASE INVOLVING CORONARY BYPASS GRAFT OF NATIVE HEART WITHOUT ANGINA PECTORIS: ICD-10-CM

## 2020-09-25 PROBLEM — G93.41 ENCEPHALOPATHY, METABOLIC: Status: ACTIVE | Noted: 2020-09-25

## 2020-09-25 PROBLEM — E11.9 TYPE 2 DIABETES MELLITUS (HCC): Status: ACTIVE | Noted: 2020-09-25

## 2020-09-25 LAB
ALBUMIN SERPL-MCNC: 3.6 G/DL (ref 3.5–5.2)
ALBUMIN/GLOB SERPL: 1.3 G/DL
ALP SERPL-CCNC: 75 U/L (ref 39–117)
ALT SERPL W P-5'-P-CCNC: 20 U/L (ref 1–33)
ANION GAP SERPL CALCULATED.3IONS-SCNC: 6 MMOL/L (ref 5–15)
ANION GAP SERPL CALCULATED.3IONS-SCNC: 8 MMOL/L (ref 5–15)
ARTERIAL PATENCY WRIST A: ABNORMAL
AST SERPL-CCNC: 16 U/L (ref 1–32)
ATMOSPHERIC PRESS: ABNORMAL MM[HG]
BASE EXCESS BLDA CALC-SCNC: -1.9 MMOL/L (ref 0–2)
BASOPHILS # BLD AUTO: 0.04 10*3/MM3 (ref 0–0.2)
BASOPHILS NFR BLD AUTO: 0.6 % (ref 0–1.5)
BDY SITE: ABNORMAL
BILIRUB SERPL-MCNC: <0.2 MG/DL (ref 0–1.2)
BODY TEMPERATURE: 37 C
BUN SERPL-MCNC: 11 MG/DL (ref 8–23)
BUN SERPL-MCNC: 14 MG/DL (ref 8–23)
BUN/CREAT SERPL: 10.2 (ref 7–25)
BUN/CREAT SERPL: 10.3 (ref 7–25)
CALCIUM SPEC-SCNC: 8.4 MG/DL (ref 8.6–10.5)
CALCIUM SPEC-SCNC: 8.8 MG/DL (ref 8.6–10.5)
CHLORIDE SERPL-SCNC: 111 MMOL/L (ref 98–107)
CHLORIDE SERPL-SCNC: 111 MMOL/L (ref 98–107)
CHOLEST SERPL-MCNC: 176 MG/DL (ref 0–200)
CO2 BLDA-SCNC: 25.8 MMOL/L (ref 22–33)
CO2 SERPL-SCNC: 22 MMOL/L (ref 22–29)
CO2 SERPL-SCNC: 24 MMOL/L (ref 22–29)
COHGB MFR BLD: 0.6 % (ref 0–2)
CREAT SERPL-MCNC: 1.07 MG/DL (ref 0.57–1)
CREAT SERPL-MCNC: 1.37 MG/DL (ref 0.57–1)
D-LACTATE SERPL-SCNC: 0.7 MMOL/L (ref 0.5–2)
DEPRECATED RDW RBC AUTO: 45.1 FL (ref 37–54)
DEPRECATED RDW RBC AUTO: 46.4 FL (ref 37–54)
EOSINOPHIL # BLD AUTO: 0.31 10*3/MM3 (ref 0–0.4)
EOSINOPHIL NFR BLD AUTO: 4.8 % (ref 0.3–6.2)
ERYTHROCYTE [DISTWIDTH] IN BLOOD BY AUTOMATED COUNT: 13 % (ref 12.3–15.4)
ERYTHROCYTE [DISTWIDTH] IN BLOOD BY AUTOMATED COUNT: 13.1 % (ref 12.3–15.4)
GFR SERPL CREATININE-BSD FRML MDRD: 39 ML/MIN/1.73
GFR SERPL CREATININE-BSD FRML MDRD: 52 ML/MIN/1.73
GLOBULIN UR ELPH-MCNC: 2.7 GM/DL
GLUCOSE BLDC GLUCOMTR-MCNC: 106 MG/DL (ref 70–130)
GLUCOSE BLDC GLUCOMTR-MCNC: 138 MG/DL (ref 70–130)
GLUCOSE BLDC GLUCOMTR-MCNC: 80 MG/DL (ref 70–130)
GLUCOSE BLDC GLUCOMTR-MCNC: 86 MG/DL (ref 70–130)
GLUCOSE BLDC GLUCOMTR-MCNC: 88 MG/DL (ref 70–130)
GLUCOSE SERPL-MCNC: 109 MG/DL (ref 65–99)
GLUCOSE SERPL-MCNC: 151 MG/DL (ref 65–99)
HBA1C MFR BLD: 7.8 % (ref 4.8–5.6)
HCO3 BLDA-SCNC: 24.4 MMOL/L (ref 20–26)
HCT VFR BLD AUTO: 34.9 % (ref 34–46.6)
HCT VFR BLD AUTO: 35.2 % (ref 34–46.6)
HCT VFR BLD CALC: 33.2 %
HDLC SERPL-MCNC: 59 MG/DL (ref 40–60)
HGB BLD-MCNC: 10.4 G/DL (ref 12–15.9)
HGB BLD-MCNC: 10.9 G/DL (ref 12–15.9)
HGB BLDA-MCNC: 10.8 G/DL (ref 14–18)
IMM GRANULOCYTES # BLD AUTO: 0.02 10*3/MM3 (ref 0–0.05)
IMM GRANULOCYTES NFR BLD AUTO: 0.3 % (ref 0–0.5)
INHALED O2 CONCENTRATION: 28 %
LDLC SERPL CALC-MCNC: 86 MG/DL (ref 0–100)
LDLC/HDLC SERPL: 1.46 {RATIO}
LYMPHOCYTES # BLD AUTO: 1.76 10*3/MM3 (ref 0.7–3.1)
LYMPHOCYTES NFR BLD AUTO: 27.5 % (ref 19.6–45.3)
MCH RBC QN AUTO: 28.6 PG (ref 26.6–33)
MCH RBC QN AUTO: 29.4 PG (ref 26.6–33)
MCHC RBC AUTO-ENTMCNC: 29.8 G/DL (ref 31.5–35.7)
MCHC RBC AUTO-ENTMCNC: 31 G/DL (ref 31.5–35.7)
MCV RBC AUTO: 94.9 FL (ref 79–97)
MCV RBC AUTO: 95.9 FL (ref 79–97)
METHGB BLD QL: 1 % (ref 0–1.5)
MODALITY: ABNORMAL
MONOCYTES # BLD AUTO: 0.47 10*3/MM3 (ref 0.1–0.9)
MONOCYTES NFR BLD AUTO: 7.3 % (ref 5–12)
NEUTROPHILS NFR BLD AUTO: 3.81 10*3/MM3 (ref 1.7–7)
NEUTROPHILS NFR BLD AUTO: 59.5 % (ref 42.7–76)
NOTE: ABNORMAL
NRBC BLD AUTO-RTO: 0 /100 WBC (ref 0–0.2)
OXYHGB MFR BLDV: 95.5 % (ref 94–99)
PCO2 BLDA: 47 MM HG (ref 35–45)
PCO2 TEMP ADJ BLD: 47 MM HG (ref 35–45)
PH BLDA: 7.32 PH UNITS (ref 7.35–7.45)
PH, TEMP CORRECTED: 7.32 PH UNITS
PLATELET # BLD AUTO: 313 10*3/MM3 (ref 140–450)
PLATELET # BLD AUTO: 315 10*3/MM3 (ref 140–450)
PMV BLD AUTO: 11.2 FL (ref 6–12)
PMV BLD AUTO: 11.3 FL (ref 6–12)
PO2 BLDA: 89.2 MM HG (ref 83–108)
PO2 TEMP ADJ BLD: 89.2 MM HG (ref 83–108)
POTASSIUM SERPL-SCNC: 4.2 MMOL/L (ref 3.5–5.2)
POTASSIUM SERPL-SCNC: 5.1 MMOL/L (ref 3.5–5.2)
PROT SERPL-MCNC: 6.3 G/DL (ref 6–8.5)
RBC # BLD AUTO: 3.64 10*6/MM3 (ref 3.77–5.28)
RBC # BLD AUTO: 3.71 10*6/MM3 (ref 3.77–5.28)
SODIUM SERPL-SCNC: 141 MMOL/L (ref 136–145)
SODIUM SERPL-SCNC: 141 MMOL/L (ref 136–145)
TRIGL SERPL-MCNC: 153 MG/DL (ref 0–150)
VENTILATOR MODE: ABNORMAL
VLDLC SERPL-MCNC: 30.6 MG/DL
WBC # BLD AUTO: 6.41 10*3/MM3 (ref 3.4–10.8)
WBC # BLD AUTO: 7.92 10*3/MM3 (ref 3.4–10.8)

## 2020-09-25 PROCEDURE — 36600 WITHDRAWAL OF ARTERIAL BLOOD: CPT

## 2020-09-25 PROCEDURE — 99152 MOD SED SAME PHYS/QHP 5/>YRS: CPT | Performed by: INTERNAL MEDICINE

## 2020-09-25 PROCEDURE — C1894 INTRO/SHEATH, NON-LASER: HCPCS | Performed by: INTERNAL MEDICINE

## 2020-09-25 PROCEDURE — 80061 LIPID PANEL: CPT | Performed by: NURSE PRACTITIONER

## 2020-09-25 PROCEDURE — 93455 CORONARY ART/GRFT ANGIO S&I: CPT | Performed by: INTERNAL MEDICINE

## 2020-09-25 PROCEDURE — 99218 PR INITIAL OBSERVATION CARE/DAY 30 MINUTES: CPT | Performed by: NURSE PRACTITIONER

## 2020-09-25 PROCEDURE — 99153 MOD SED SAME PHYS/QHP EA: CPT | Performed by: INTERNAL MEDICINE

## 2020-09-25 PROCEDURE — 85025 COMPLETE CBC W/AUTO DIFF WBC: CPT | Performed by: NURSE PRACTITIONER

## 2020-09-25 PROCEDURE — 94799 UNLISTED PULMONARY SVC/PX: CPT

## 2020-09-25 PROCEDURE — C1769 GUIDE WIRE: HCPCS | Performed by: INTERNAL MEDICINE

## 2020-09-25 PROCEDURE — 71045 X-RAY EXAM CHEST 1 VIEW: CPT

## 2020-09-25 PROCEDURE — 0 IOPAMIDOL PER 1 ML: Performed by: INTERNAL MEDICINE

## 2020-09-25 PROCEDURE — 25010000002 HEPARIN (PORCINE) PER 1000 UNITS: Performed by: INTERNAL MEDICINE

## 2020-09-25 PROCEDURE — 83605 ASSAY OF LACTIC ACID: CPT | Performed by: NURSE PRACTITIONER

## 2020-09-25 PROCEDURE — 82962 GLUCOSE BLOOD TEST: CPT

## 2020-09-25 PROCEDURE — 85027 COMPLETE CBC AUTOMATED: CPT | Performed by: NURSE PRACTITIONER

## 2020-09-25 PROCEDURE — 36252 INS CATH REN ART 1ST BILAT: CPT | Performed by: INTERNAL MEDICINE

## 2020-09-25 PROCEDURE — 25010000002 FENTANYL CITRATE (PF) 100 MCG/2ML SOLUTION: Performed by: INTERNAL MEDICINE

## 2020-09-25 PROCEDURE — 25010000002 MIDAZOLAM PER 1 MG: Performed by: INTERNAL MEDICINE

## 2020-09-25 PROCEDURE — 82805 BLOOD GASES W/O2 SATURATION: CPT

## 2020-09-25 PROCEDURE — 80053 COMPREHEN METABOLIC PANEL: CPT | Performed by: NURSE PRACTITIONER

## 2020-09-25 PROCEDURE — 83036 HEMOGLOBIN GLYCOSYLATED A1C: CPT | Performed by: NURSE PRACTITIONER

## 2020-09-25 RX ORDER — ASPIRIN 325 MG
325 TABLET, DELAYED RELEASE (ENTERIC COATED) ORAL DAILY
Status: DISCONTINUED | OUTPATIENT
Start: 2020-09-26 | End: 2020-09-25 | Stop reason: HOSPADM

## 2020-09-25 RX ORDER — ACETAMINOPHEN 325 MG/1
650 TABLET ORAL EVERY 4 HOURS PRN
Status: DISCONTINUED | OUTPATIENT
Start: 2020-09-25 | End: 2020-09-26 | Stop reason: HOSPADM

## 2020-09-25 RX ORDER — NICOTINE POLACRILEX 4 MG
15 LOZENGE BUCCAL
Status: DISCONTINUED | OUTPATIENT
Start: 2020-09-25 | End: 2020-09-26 | Stop reason: HOSPADM

## 2020-09-25 RX ORDER — NALOXONE HYDROCHLORIDE 0.4 MG/ML
INJECTION, SOLUTION INTRAMUSCULAR; INTRAVENOUS; SUBCUTANEOUS
Status: DISCONTINUED
Start: 2020-09-25 | End: 2020-09-26 | Stop reason: HOSPADM

## 2020-09-25 RX ORDER — NALOXONE HYDROCHLORIDE 1 MG/ML
INJECTION INTRAMUSCULAR; INTRAVENOUS; SUBCUTANEOUS
Status: COMPLETED | OUTPATIENT
Start: 2020-09-25 | End: 2020-09-25

## 2020-09-25 RX ORDER — FLUMAZENIL 0.1 MG/ML
INJECTION INTRAVENOUS
Status: DISCONTINUED
Start: 2020-09-25 | End: 2020-09-26 | Stop reason: HOSPADM

## 2020-09-25 RX ORDER — FLUMAZENIL 0.1 MG/ML
INJECTION INTRAVENOUS
Status: COMPLETED | OUTPATIENT
Start: 2020-09-25 | End: 2020-09-25

## 2020-09-25 RX ORDER — ASPIRIN 325 MG
325 TABLET ORAL ONCE
Status: COMPLETED | OUTPATIENT
Start: 2020-09-25 | End: 2020-09-25

## 2020-09-25 RX ORDER — NITROGLYCERIN 0.4 MG/1
0.4 TABLET SUBLINGUAL
Status: DISCONTINUED | OUTPATIENT
Start: 2020-09-25 | End: 2020-09-25 | Stop reason: HOSPADM

## 2020-09-25 RX ORDER — SODIUM CHLORIDE 0.9 % (FLUSH) 0.9 %
10 SYRINGE (ML) INJECTION AS NEEDED
Status: DISCONTINUED | OUTPATIENT
Start: 2020-09-25 | End: 2020-09-25 | Stop reason: HOSPADM

## 2020-09-25 RX ORDER — LIDOCAINE HYDROCHLORIDE 10 MG/ML
INJECTION, SOLUTION EPIDURAL; INFILTRATION; INTRACAUDAL; PERINEURAL AS NEEDED
Status: DISCONTINUED | OUTPATIENT
Start: 2020-09-25 | End: 2020-09-25 | Stop reason: HOSPADM

## 2020-09-25 RX ORDER — SODIUM CHLORIDE 0.9 % (FLUSH) 0.9 %
10 SYRINGE (ML) INJECTION EVERY 12 HOURS SCHEDULED
Status: DISCONTINUED | OUTPATIENT
Start: 2020-09-25 | End: 2020-09-26 | Stop reason: HOSPADM

## 2020-09-25 RX ORDER — SODIUM CHLORIDE 0.9 % (FLUSH) 0.9 %
3 SYRINGE (ML) INJECTION EVERY 12 HOURS SCHEDULED
Status: DISCONTINUED | OUTPATIENT
Start: 2020-09-25 | End: 2020-09-25 | Stop reason: HOSPADM

## 2020-09-25 RX ORDER — ONDANSETRON 2 MG/ML
4 INJECTION INTRAMUSCULAR; INTRAVENOUS EVERY 6 HOURS PRN
Status: DISCONTINUED | OUTPATIENT
Start: 2020-09-25 | End: 2020-09-25 | Stop reason: HOSPADM

## 2020-09-25 RX ORDER — LORAZEPAM 2 MG/ML
INJECTION INTRAMUSCULAR
Status: DISCONTINUED
Start: 2020-09-25 | End: 2020-09-26 | Stop reason: HOSPADM

## 2020-09-25 RX ORDER — MIDAZOLAM HYDROCHLORIDE 1 MG/ML
INJECTION INTRAMUSCULAR; INTRAVENOUS AS NEEDED
Status: DISCONTINUED | OUTPATIENT
Start: 2020-09-25 | End: 2020-09-25 | Stop reason: HOSPADM

## 2020-09-25 RX ORDER — METOPROLOL TARTRATE 50 MG/1
25 TABLET, FILM COATED ORAL 2 TIMES DAILY
Qty: 60 TABLET | Refills: 11 | Status: SHIPPED | OUTPATIENT
Start: 2020-09-25 | End: 2020-09-26 | Stop reason: SDUPTHER

## 2020-09-25 RX ORDER — DEXTROSE MONOHYDRATE 25 G/50ML
25 INJECTION, SOLUTION INTRAVENOUS
Status: DISCONTINUED | OUTPATIENT
Start: 2020-09-25 | End: 2020-09-26 | Stop reason: HOSPADM

## 2020-09-25 RX ORDER — FENTANYL CITRATE 50 UG/ML
INJECTION, SOLUTION INTRAMUSCULAR; INTRAVENOUS AS NEEDED
Status: DISCONTINUED | OUTPATIENT
Start: 2020-09-25 | End: 2020-09-25 | Stop reason: HOSPADM

## 2020-09-25 RX ORDER — SODIUM CHLORIDE 0.9 % (FLUSH) 0.9 %
10 SYRINGE (ML) INJECTION AS NEEDED
Status: DISCONTINUED | OUTPATIENT
Start: 2020-09-25 | End: 2020-09-26 | Stop reason: HOSPADM

## 2020-09-25 RX ORDER — SODIUM CHLORIDE 9 MG/ML
1-3 INJECTION, SOLUTION INTRAVENOUS CONTINUOUS
Status: DISCONTINUED | OUTPATIENT
Start: 2020-09-25 | End: 2020-09-26 | Stop reason: HOSPADM

## 2020-09-25 RX ADMIN — FLUMAZENIL 0.3 MG: 0.1 INJECTION, SOLUTION INTRAVENOUS at 17:53

## 2020-09-25 RX ADMIN — NALOXONE HYDROCHLORIDE 0.4 MG: 1 INJECTION PARENTERAL at 17:37

## 2020-09-25 RX ADMIN — ASPIRIN 325 MG ORAL TABLET 325 MG: 325 PILL ORAL at 10:15

## 2020-09-25 RX ADMIN — FLUMAZENIL 0.2 MG: 0.1 INJECTION, SOLUTION INTRAVENOUS at 17:46

## 2020-09-25 RX ADMIN — SODIUM CHLORIDE 3 ML/KG/HR: 9 INJECTION, SOLUTION INTRAVENOUS at 10:15

## 2020-09-25 RX ADMIN — SODIUM CHLORIDE, PRESERVATIVE FREE 10 ML: 5 INJECTION INTRAVENOUS at 22:06

## 2020-09-25 RX ADMIN — NALOXONE HYDROCHLORIDE 0.4 MG: 1 INJECTION PARENTERAL at 17:40

## 2020-09-26 VITALS
OXYGEN SATURATION: 92 % | DIASTOLIC BLOOD PRESSURE: 83 MMHG | HEIGHT: 60 IN | WEIGHT: 230 LBS | BODY MASS INDEX: 45.16 KG/M2 | SYSTOLIC BLOOD PRESSURE: 165 MMHG | RESPIRATION RATE: 20 BRPM | TEMPERATURE: 97.7 F | HEART RATE: 91 BPM

## 2020-09-26 LAB
GLUCOSE BLDC GLUCOMTR-MCNC: 175 MG/DL (ref 70–130)
GLUCOSE BLDC GLUCOMTR-MCNC: 93 MG/DL (ref 70–130)
GLUCOSE BLDC GLUCOMTR-MCNC: 98 MG/DL (ref 70–130)

## 2020-09-26 PROCEDURE — 93005 ELECTROCARDIOGRAM TRACING: CPT | Performed by: INTERNAL MEDICINE

## 2020-09-26 PROCEDURE — 63710000001 INSULIN LISPRO (HUMAN) PER 5 UNITS: Performed by: NURSE PRACTITIONER

## 2020-09-26 PROCEDURE — A9270 NON-COVERED ITEM OR SERVICE: HCPCS | Performed by: NURSE PRACTITIONER

## 2020-09-26 PROCEDURE — 93010 ELECTROCARDIOGRAM REPORT: CPT | Performed by: INTERNAL MEDICINE

## 2020-09-26 PROCEDURE — 82962 GLUCOSE BLOOD TEST: CPT

## 2020-09-26 PROCEDURE — 99213 OFFICE O/P EST LOW 20 MIN: CPT | Performed by: NURSE PRACTITIONER

## 2020-09-26 RX ORDER — METOPROLOL TARTRATE 50 MG/1
50 TABLET, FILM COATED ORAL 2 TIMES DAILY
Qty: 60 TABLET | Refills: 11 | Status: SHIPPED | OUTPATIENT
Start: 2020-09-26 | End: 2020-09-27 | Stop reason: SDUPTHER

## 2020-09-26 RX ORDER — RANOLAZINE 500 MG/1
500 TABLET, EXTENDED RELEASE ORAL 2 TIMES DAILY
Qty: 60 TABLET | Refills: 6 | Status: SHIPPED | OUTPATIENT
Start: 2020-09-26 | End: 2020-09-27 | Stop reason: SDUPTHER

## 2020-09-26 RX ADMIN — INSULIN LISPRO 2 UNITS: 100 INJECTION, SOLUTION INTRAVENOUS; SUBCUTANEOUS at 13:29

## 2020-09-27 RX ORDER — RANOLAZINE 500 MG/1
500 TABLET, EXTENDED RELEASE ORAL 2 TIMES DAILY
Qty: 60 TABLET | Refills: 6 | Status: SHIPPED | OUTPATIENT
Start: 2020-09-27 | End: 2020-11-17 | Stop reason: SDUPTHER

## 2020-09-27 RX ORDER — METOPROLOL TARTRATE 50 MG/1
50 TABLET, FILM COATED ORAL 2 TIMES DAILY
Qty: 60 TABLET | Refills: 11 | Status: SHIPPED | OUTPATIENT
Start: 2020-09-27 | End: 2020-11-17 | Stop reason: SDUPTHER

## 2020-09-29 ENCOUNTER — DOCUMENTATION (OUTPATIENT)
Dept: CARDIAC REHAB | Facility: HOSPITAL | Age: 60
End: 2020-09-29

## 2020-10-29 ENCOUNTER — HOSPITAL ENCOUNTER (OUTPATIENT)
Dept: CARDIOLOGY | Facility: HOSPITAL | Age: 60
Discharge: HOME | End: 2020-10-29

## 2020-10-29 VITALS — BODY MASS INDEX: 45.16 KG/M2 | HEIGHT: 60 IN | WEIGHT: 230 LBS

## 2020-10-29 VITALS — HEIGHT: 60 IN | WEIGHT: 230 LBS | BODY MASS INDEX: 45.16 KG/M2

## 2020-10-29 DIAGNOSIS — R09.89 BILATERAL CAROTID BRUITS: ICD-10-CM

## 2020-10-29 DIAGNOSIS — R06.09 DOE (DYSPNEA ON EXERTION): ICD-10-CM

## 2020-10-29 LAB
BH CV ECHO MEAS - AO MAX PG (FULL): 3.9 MMHG
BH CV ECHO MEAS - AO MAX PG: 8 MMHG
BH CV ECHO MEAS - AO MEAN PG (FULL): 2 MMHG
BH CV ECHO MEAS - AO MEAN PG: 4 MMHG
BH CV ECHO MEAS - AO ROOT AREA (BSA CORRECTED): 1.5
BH CV ECHO MEAS - AO ROOT AREA: 6.6 CM^2
BH CV ECHO MEAS - AO ROOT DIAM: 2.9 CM
BH CV ECHO MEAS - AO V2 MAX: 143 CM/SEC
BH CV ECHO MEAS - AO V2 MEAN: 98.6 CM/SEC
BH CV ECHO MEAS - AO V2 VTI: 33.6 CM
BH CV ECHO MEAS - AVA(I,A): 2.4 CM^2
BH CV ECHO MEAS - AVA(I,D): 2.4 CM^2
BH CV ECHO MEAS - AVA(V,A): 2.2 CM^2
BH CV ECHO MEAS - AVA(V,D): 2.2 CM^2
BH CV ECHO MEAS - BSA(HAYCOCK): 2.2 M^2
BH CV ECHO MEAS - BSA(HAYCOCK): 2.2 M^2
BH CV ECHO MEAS - BSA: 2 M^2
BH CV ECHO MEAS - BSA: 2 M^2
BH CV ECHO MEAS - BZI_BMI: 44.9 KILOGRAMS/M^2
BH CV ECHO MEAS - BZI_BMI: 44.9 KILOGRAMS/M^2
BH CV ECHO MEAS - BZI_METRIC_HEIGHT: 152.4 CM
BH CV ECHO MEAS - BZI_METRIC_HEIGHT: 152.4 CM
BH CV ECHO MEAS - BZI_METRIC_WEIGHT: 104.3 KG
BH CV ECHO MEAS - BZI_METRIC_WEIGHT: 104.3 KG
BH CV ECHO MEAS - EDV(CUBED): 99.9 ML
BH CV ECHO MEAS - EDV(MOD-SP2): 81 ML
BH CV ECHO MEAS - EDV(MOD-SP4): 97 ML
BH CV ECHO MEAS - EDV(TEICH): 99.3 ML
BH CV ECHO MEAS - EF(CUBED): 78.3 %
BH CV ECHO MEAS - EF(MOD-BP): 62 %
BH CV ECHO MEAS - EF(MOD-SP2): 64.2 %
BH CV ECHO MEAS - EF(MOD-SP4): 62.9 %
BH CV ECHO MEAS - EF(TEICH): 70.5 %
BH CV ECHO MEAS - ESV(CUBED): 21.7 ML
BH CV ECHO MEAS - ESV(MOD-SP2): 29 ML
BH CV ECHO MEAS - ESV(MOD-SP4): 36 ML
BH CV ECHO MEAS - ESV(TEICH): 29.3 ML
BH CV ECHO MEAS - FS: 39.9 %
BH CV ECHO MEAS - IVS/LVPW: 1.1
BH CV ECHO MEAS - IVSD: 0.99 CM
BH CV ECHO MEAS - LAD MAJOR: 5.5 CM
BH CV ECHO MEAS - LAT PEAK E' VEL: 8 CM/SEC
BH CV ECHO MEAS - LATERAL E/E' RATIO: 9.2
BH CV ECHO MEAS - LV DIASTOLIC VOL/BSA (35-75): 49 ML/M^2
BH CV ECHO MEAS - LV MASS(C)D: 149.7 GRAMS
BH CV ECHO MEAS - LV MASS(C)DI: 75.6 GRAMS/M^2
BH CV ECHO MEAS - LV MAX PG: 4.1 MMHG
BH CV ECHO MEAS - LV MEAN PG: 2 MMHG
BH CV ECHO MEAS - LV SYSTOLIC VOL/BSA (12-30): 18.2 ML/M^2
BH CV ECHO MEAS - LV V1 MAX: 101 CM/SEC
BH CV ECHO MEAS - LV V1 MEAN: 69.2 CM/SEC
BH CV ECHO MEAS - LV V1 VTI: 25.5 CM
BH CV ECHO MEAS - LVIDD: 4.6 CM
BH CV ECHO MEAS - LVIDS: 2.8 CM
BH CV ECHO MEAS - LVLD AP2: 7.9 CM
BH CV ECHO MEAS - LVLD AP4: 8.2 CM
BH CV ECHO MEAS - LVLS AP2: 6.8 CM
BH CV ECHO MEAS - LVLS AP4: 6.2 CM
BH CV ECHO MEAS - LVOT AREA (M): 3.1 CM^2
BH CV ECHO MEAS - LVOT AREA: 3.1 CM^2
BH CV ECHO MEAS - LVOT DIAM: 2 CM
BH CV ECHO MEAS - LVPWD: 0.9 CM
BH CV ECHO MEAS - MED PEAK E' VEL: 6.6 CM/SEC
BH CV ECHO MEAS - MEDIAL E/E' RATIO: 11.2
BH CV ECHO MEAS - MV A MAX VEL: 107 CM/SEC
BH CV ECHO MEAS - MV DEC TIME: 0.19 SEC
BH CV ECHO MEAS - MV E MAX VEL: 73.4 CM/SEC
BH CV ECHO MEAS - MV E/A: 0.69
BH CV ECHO MEAS - PA ACC SLOPE: 392 CM/SEC^2
BH CV ECHO MEAS - PA ACC TIME: 0.12 SEC
BH CV ECHO MEAS - PA PR(ACCEL): 25 MMHG
BH CV ECHO MEAS - SI(AO): 112 ML/M^2
BH CV ECHO MEAS - SI(CUBED): 39.5 ML/M^2
BH CV ECHO MEAS - SI(LVOT): 40.4 ML/M^2
BH CV ECHO MEAS - SI(MOD-SP2): 26.3 ML/M^2
BH CV ECHO MEAS - SI(MOD-SP4): 30.8 ML/M^2
BH CV ECHO MEAS - SI(TEICH): 35.4 ML/M^2
BH CV ECHO MEAS - SV(AO): 221.9 ML
BH CV ECHO MEAS - SV(CUBED): 78.2 ML
BH CV ECHO MEAS - SV(LVOT): 80.1 ML
BH CV ECHO MEAS - SV(MOD-SP2): 52 ML
BH CV ECHO MEAS - SV(MOD-SP4): 61 ML
BH CV ECHO MEAS - SV(TEICH): 70 ML
BH CV ECHO MEAS - TAPSE (>1.6): 1.5 CM
BH CV ECHO MEASUREMENTS AVERAGE E/E' RATIO: 10.05
BH CV VAS BP LEFT ARM: NORMAL MMHG
BH CV VAS BP RIGHT ARM: NORMAL MMHG
BH CV XLRA - RV BASE: 3.5 CM
BH CV XLRA - RV LENGTH: 7.5 CM
BH CV XLRA - RV MID: 2.8 CM
BH CV XLRA - TDI S': 7.35 CM/SEC
BH CV XLRA MEAS LEFT CCA RATIO VEL: 67.6 CM/SEC
BH CV XLRA MEAS LEFT DIST CCA EDV: 22.6 CM/SEC
BH CV XLRA MEAS LEFT DIST CCA PSV: 72.9 CM/SEC
BH CV XLRA MEAS LEFT DIST ICA EDV: 49.1 CM/SEC
BH CV XLRA MEAS LEFT DIST ICA PSV: 166 CM/SEC
BH CV XLRA MEAS LEFT ICA RATIO VEL: 77.3 CM/SEC
BH CV XLRA MEAS LEFT ICA/CCA RATIO: 1.1
BH CV XLRA MEAS LEFT MID CCA EDV: 19.6 CM/SEC
BH CV XLRA MEAS LEFT MID CCA PSV: 67.6 CM/SEC
BH CV XLRA MEAS LEFT MID ICA EDV: 24.5 CM/SEC
BH CV XLRA MEAS LEFT MID ICA PSV: 77.3 CM/SEC
BH CV XLRA MEAS LEFT PROX CCA EDV: 20.4 CM/SEC
BH CV XLRA MEAS LEFT PROX CCA PSV: 109 CM/SEC
BH CV XLRA MEAS LEFT PROX ECA PSV: 653 CM/SEC
BH CV XLRA MEAS LEFT PROX ICA EDV: 19.5 CM/SEC
BH CV XLRA MEAS LEFT PROX ICA PSV: 75.4 CM/SEC
BH CV XLRA MEAS LEFT PROX SCLA PSV: 225 CM/SEC
BH CV XLRA MEAS LEFT VERTEBRAL A PSV: 35.6 CM/SEC
BH CV XLRA MEAS RIGHT CCA RATIO VEL: 79.4 CM/SEC
BH CV XLRA MEAS RIGHT DIST CCA EDV: 11.9 CM/SEC
BH CV XLRA MEAS RIGHT DIST CCA PSV: 74.2 CM/SEC
BH CV XLRA MEAS RIGHT DIST ICA EDV: 28.9 CM/SEC
BH CV XLRA MEAS RIGHT DIST ICA PSV: 85.5 CM/SEC
BH CV XLRA MEAS RIGHT ICA RATIO VEL: 82.3 CM/SEC
BH CV XLRA MEAS RIGHT ICA/CCA RATIO: 1
BH CV XLRA MEAS RIGHT MID CCA EDV: 14.1 CM/SEC
BH CV XLRA MEAS RIGHT MID CCA PSV: 79.4 CM/SEC
BH CV XLRA MEAS RIGHT MID ICA EDV: 27.7 CM/SEC
BH CV XLRA MEAS RIGHT MID ICA PSV: 77.9 CM/SEC
BH CV XLRA MEAS RIGHT PROX CCA EDV: 18.3 CM/SEC
BH CV XLRA MEAS RIGHT PROX CCA PSV: 92.5 CM/SEC
BH CV XLRA MEAS RIGHT PROX ECA PSV: 250 CM/SEC
BH CV XLRA MEAS RIGHT PROX ICA EDV: 17 CM/SEC
BH CV XLRA MEAS RIGHT PROX ICA PSV: 82.3 CM/SEC
BH CV XLRA MEAS RIGHT PROX SCLA PSV: 272 CM/SEC
BH CV XLRA MEAS RIGHT VERTEBRAL A PSV: 49.1 CM/SEC
LEFT ARM BP: NORMAL MMHG
LEFT ATRIUM VOLUME INDEX: 26.8 ML/M^2
LEFT ATRIUM VOLUME: 53 ML
LV EF 2D ECHO EST: 60 %
MAXIMAL PREDICTED HEART RATE: 160 BPM
RIGHT ARM BP: NORMAL MMHG
STRESS TARGET HR: 136 BPM

## 2020-10-29 PROCEDURE — 93880 EXTRACRANIAL BILAT STUDY: CPT

## 2020-10-29 PROCEDURE — 25010000002 SULFUR HEXAFLUORIDE MICROSPH 60.7-25 MG RECONSTITUTED SUSPENSION: Performed by: INTERNAL MEDICINE

## 2020-10-29 PROCEDURE — 93306 TTE W/DOPPLER COMPLETE: CPT | Performed by: INTERNAL MEDICINE

## 2020-10-29 PROCEDURE — 93880 EXTRACRANIAL BILAT STUDY: CPT | Performed by: INTERNAL MEDICINE

## 2020-10-29 PROCEDURE — 93306 TTE W/DOPPLER COMPLETE: CPT

## 2020-10-29 RX ADMIN — SULFUR HEXAFLUORIDE 3 ML: KIT at 10:25

## 2020-11-04 ENCOUNTER — OFFICE VISIT (OUTPATIENT)
Dept: CARDIOLOGY | Facility: CLINIC | Age: 60
End: 2020-11-04

## 2020-11-04 VITALS
HEART RATE: 61 BPM | BODY MASS INDEX: 44.17 KG/M2 | SYSTOLIC BLOOD PRESSURE: 130 MMHG | HEIGHT: 60 IN | WEIGHT: 225 LBS | OXYGEN SATURATION: 96 % | DIASTOLIC BLOOD PRESSURE: 62 MMHG

## 2020-11-04 DIAGNOSIS — I10 ESSENTIAL HYPERTENSION: ICD-10-CM

## 2020-11-04 DIAGNOSIS — I25.810 CORONARY ARTERY DISEASE INVOLVING CORONARY BYPASS GRAFT OF NATIVE HEART WITHOUT ANGINA PECTORIS: Primary | ICD-10-CM

## 2020-11-04 DIAGNOSIS — E78.00 PURE HYPERCHOLESTEROLEMIA: ICD-10-CM

## 2020-11-04 DIAGNOSIS — G47.33 OSA (OBSTRUCTIVE SLEEP APNEA): ICD-10-CM

## 2020-11-04 PROCEDURE — 99214 OFFICE O/P EST MOD 30 MIN: CPT | Performed by: INTERNAL MEDICINE

## 2020-11-04 NOTE — PROGRESS NOTES
Subjective:     Encounter Date:2020    Primary Care Physician: Santosh Melara DO      Patient ID: Anastasia Villalba is a 60 y.o. female.    Chief Complaint:Follow-up    PROBLEM LIST:  1. Coronary artery disease:  a. , [facility] with multivessel coronary disease, normal LVEF, cardiac catheterization by Dr. Varela.  b. 2008, coronary artery bypass grafting x 4 by Dr. Cowan, incomplete database.   c. Postoperative sternal wound infection (MRSA), status post multiple surgery debridement, as well as eventual partial sternum removal and rib removal x 4.    d. On 2014, abnormal myocardial perfusion study ready by Dr. Tripp Madrid with a small area of possible ischemia in the anterolateral wall, EF of 76%.    e. 2014, echocardiogram with normal LVEF and no significant valvular abnormalities.    f. On 2014, cardiac cath showed all widely patent grafts.  No flow-obstructive disease distal.  Normal LVEF.  g. 2018 cardiac catheterization with widely patent grafts.  Severe diffuse native vessel small vessel disease.  Not amenable to any revascularization.  EF of 50%.  h. 2020 [facility] all widely patent grafts.  Severe diffuse distal native vessel disease particularly of the circumflex and LAD not amenable to revascularization.  2. Hypertension.   a. 2020 renal arteriogram normal left renal artery.  95% medial interlobar branch of the right renal otherwise normal right renal artery.  Medical management.  3. Dyslipidemia.   4. Diabetes with neuropathy.    5. Sleep apnea on CPAP  6. Depression.   7. GERD.  8. Anxiety.   9. Arthritis.   10. Fibromyalgia.   11. Remote right knee surgery.   12.  x 2.   13. Chronic pain syndrome.   14. Remote tobacco abuse.    15. Carpal tunnel surgery         No Known Allergies      Current Outpatient Medications:   •  amLODIPine (NORVASC) 5 MG tablet, Take 5 mg by mouth Daily., Disp: , Rfl:   •  aspirin (aspirin) 81 MG EC tablet, Take 81 mg by  mouth Daily., Disp: , Rfl:   •  atorvastatin (LIPITOR) 80 MG tablet, Take 80 mg by mouth Daily., Disp: , Rfl:   •  clopidogrel (PLAVIX) 75 MG tablet, TAKE 1 TABLET EVERY DAY, Disp: 90 tablet, Rfl: 3  •  DOXAZOSIN MESYLATE PO, Take  by mouth Daily., Disp: , Rfl:   •  Evolocumab 140 MG/ML solution auto-injector, Inject 1 mL under the skin into the appropriate area as directed Every 14 (Fourteen) Days., Disp: 6 pen, Rfl: 3  •  glipiZIDE (GLUCOTROL) 10 MG tablet, Take 10 mg by mouth 2 (Two) Times a Day Before Meals., Disp: , Rfl:   •  ISOSORBIDE DINITRATE PO, Take  by mouth Daily., Disp: , Rfl:   •  metoprolol tartrate (LOPRESSOR) 50 MG tablet, Take 1 tablet by mouth 2 (Two) Times a Day., Disp: 60 tablet, Rfl: 11  •  pantoprazole (PROTONIX) 40 MG EC tablet, Take 40 mg by mouth 2 (Two) Times a Day., Disp: , Rfl:   •  ranolazine (Ranexa) 500 MG 12 hr tablet, Take 1 tablet by mouth 2 (Two) Times a Day., Disp: 60 tablet, Rfl: 6  •  Semaglutide,0.25 or 0.5MG/DOS, (Ozempic, 0.25 or 0.5 MG/DOSE,) 2 MG/1.5ML solution pen-injector, Inject 1 mg under the skin into the appropriate area as directed 1 (One) Time Per Week., Disp: , Rfl:   •  SITagliptin (JANUVIA) 50 MG tablet, Take 50 mg by mouth 2 (two) times a day., Disp: , Rfl:   •  venlafaxine XR (EFFEXOR-XR) 150 MG 24 hr capsule, Take 450 mg by mouth Daily., Disp: , Rfl:         History of Present Illness    Patient returns today for follow-up from cardiac catheterization.  She had normal vein grafts and some diffuse distal disease at that time.  She also had distal renal artery disease.  Overall her main complaint is significant lethargy and sleeping.  She actually missed yesterday's appointment due to sleeping through it.  She had difficulty being discharged after cardiac catheterization due to inability to wake up.  She notes 2 weeks of progressive wheezing nonproductive cough and dyspnea.  She is seen by her primary care physician yesterday week after antibiotics.  No change  "with this.  She also had 24 hours of left facial numbness last Saturday for which she was instructed to go the emergency room, but as it resolved spontaneously she did not.  Has known sleep apnea by diagnosis several years ago but does not wear CPAP.    The following portions of the patient's history were reviewed and updated as appropriate: allergies, current medications, past family history, past medical history, past social history, past surgical history and problem list.      Social History     Tobacco Use   • Smoking status: Former Smoker     Quit date:      Years since quittin.8   • Smokeless tobacco: Never Used   Substance Use Topics   • Alcohol use: No   • Drug use: No         Review of Systems   Constitution: Positive for malaise/fatigue and weight gain.   Cardiovascular: Positive for dyspnea on exertion. Negative for chest pain, leg swelling, palpitations and syncope.   Respiratory: Positive for cough, shortness of breath and wheezing.    Hematologic/Lymphatic: Negative for bleeding problem. Does not bruise/bleed easily.   Skin: Negative for rash.   Musculoskeletal: Positive for arthritis, back pain, falls, joint pain, joint swelling, muscle weakness, neck pain and stiffness. Negative for myalgias.   Gastrointestinal: Positive for bloating and diarrhea. Negative for heartburn, nausea and vomiting.   Neurological: Positive for excessive daytime sleepiness, dizziness, focal weakness, loss of balance and numbness. Negative for light-headedness.   Psychiatric/Behavioral: The patient is nervous/anxious.           Objective:   /62 (BP Location: Left arm)   Pulse 61   Ht 152.4 cm (60\")   Wt 102 kg (225 lb)   SpO2 96%   BMI 43.94 kg/m²         Vitals signs reviewed.   Constitutional:       Appearance: Well-developed and not in distress.   Neck:      Thyroid: No thyromegaly.      Vascular: No carotid bruit or JVD.   Pulmonary:      Breath sounds: Normal breath sounds.   Cardiovascular:      " Regular rhythm.      No gallop. No S3 and S4 gallop.   Edema:     Pretibial: bilateral trace edema of the pretibial area.  Abdominal:      General: Bowel sounds are normal.      Palpations: Abdomen is soft. There is no abdominal mass.      Tenderness: There is no abdominal tenderness.   Musculoskeletal:         General: No deformity.      Extremities: No clubbing present.  Skin:     General: Skin is warm and dry.      Findings: No rash.   Neurological:      Mental Status: Alert and oriented to person, place, and time.         Procedures          Assessment:   Assessment/Plan      Diagnoses and all orders for this visit:    1. Coronary artery disease involving coronary bypass graft of native heart without angina pectoris (Primary)    2. Pure hypercholesterolemia    3. Essential hypertension    4. CARMELO (obstructive sleep apnea)      1.  Coronary artery disease.  Widely patent grafts.  No angina.  2.  Dyspnea on exertion, likely some combination of pulmonary disease as well as morbid obesity and sleep apnea.  3.  Hypertension currently well controlled on current medical therapy.  Single branch vessel renal artery stenosis not amenable to revascularization.  4.  Dyslipidemia on high-dose statin and Repatha  5.  Obstructive sleep apnea, currently untreated    Recommendations  1.  Need sleep apnea evaluation discussed with patient.  She is willing to see Dr. Hood for this.  2.  Continue current cardiovascular medical therapies.  3.  Exercise and weight loss needed.       Erik Caal MD      Dictated utilizing Dragon dictation

## 2020-11-17 RX ORDER — METOPROLOL TARTRATE 50 MG/1
50 TABLET, FILM COATED ORAL 2 TIMES DAILY
Qty: 180 TABLET | Refills: 3 | Status: SHIPPED | OUTPATIENT
Start: 2020-11-17 | End: 2022-03-25 | Stop reason: SDUPTHER

## 2020-11-17 RX ORDER — RANOLAZINE 500 MG/1
500 TABLET, EXTENDED RELEASE ORAL 2 TIMES DAILY
Qty: 180 TABLET | Refills: 3 | Status: SHIPPED | OUTPATIENT
Start: 2020-11-17

## 2021-11-17 ENCOUNTER — OFFICE VISIT (OUTPATIENT)
Dept: CARDIOLOGY | Facility: CLINIC | Age: 61
End: 2021-11-17

## 2021-11-17 VITALS
HEIGHT: 60 IN | DIASTOLIC BLOOD PRESSURE: 60 MMHG | OXYGEN SATURATION: 96 % | SYSTOLIC BLOOD PRESSURE: 132 MMHG | HEART RATE: 66 BPM | WEIGHT: 236 LBS | BODY MASS INDEX: 46.33 KG/M2

## 2021-11-17 DIAGNOSIS — I50.32 CHRONIC DIASTOLIC CONGESTIVE HEART FAILURE (HCC): ICD-10-CM

## 2021-11-17 DIAGNOSIS — R06.09 DOE (DYSPNEA ON EXERTION): ICD-10-CM

## 2021-11-17 DIAGNOSIS — G47.33 OSA (OBSTRUCTIVE SLEEP APNEA): ICD-10-CM

## 2021-11-17 DIAGNOSIS — E78.00 PURE HYPERCHOLESTEROLEMIA: ICD-10-CM

## 2021-11-17 DIAGNOSIS — I10 ESSENTIAL HYPERTENSION: Primary | ICD-10-CM

## 2021-11-17 DIAGNOSIS — I10 ESSENTIAL HYPERTENSION: ICD-10-CM

## 2021-11-17 DIAGNOSIS — I25.810 CORONARY ARTERY DISEASE INVOLVING CORONARY BYPASS GRAFT OF NATIVE HEART WITHOUT ANGINA PECTORIS: Primary | ICD-10-CM

## 2021-11-17 PROCEDURE — 99214 OFFICE O/P EST MOD 30 MIN: CPT | Performed by: INTERNAL MEDICINE

## 2021-11-17 RX ORDER — FERROUS SULFATE 325(65) MG
325 TABLET ORAL
COMMUNITY

## 2021-11-17 RX ORDER — FUROSEMIDE 20 MG/1
20 TABLET ORAL DAILY
COMMUNITY

## 2021-11-17 NOTE — PROGRESS NOTES
Subjective:     Encounter Date:2021    Primary Care Physician: Santosh Melara DO      Patient ID: Anastasia Villalba is a 61 y.o. female.    Chief Complaint:Follow-up    PROBLEM LIST:  1. Coronary artery disease:  a. , [facility] with multivessel coronary disease, normal LVEF, cardiac catheterization by Dr. Varela.  b. 2008, coronary artery bypass grafting x 4 by Dr. Cowan, incomplete database.   c. Postoperative sternal wound infection (MRSA), status post multiple surgery debridement, as well as eventual partial sternum removal and rib removal x 4.    d. On 2014, abnormal myocardial perfusion study ready by Dr. Tripp Madrid with a small area of possible ischemia in the anterolateral wall, EF of 76%.    e. 2014, echocardiogram with normal LVEF and no significant valvular abnormalities.    f. On 2014, cardiac cath showed all widely patent grafts.  No flow-obstructive disease distal.  Normal LVEF.  g. 2018 cardiac catheterization with widely patent grafts.  Severe diffuse native vessel small vessel disease.  Not amenable to any revascularization.  EF of 50%.  h. 2020 [facility] all widely patent grafts.  Severe diffuse distal native vessel disease particularly of the circumflex and LAD not amenable to revascularization.  2. Hypertension.   a. 2020 renal arteriogram normal left renal artery.  95% medial interlobar branch of the right renal otherwise normal right renal artery.  Medical management.  3. Dyslipidemia.   4. Diabetes with neuropathy.    5. Sleep apnea  6. Renal failure  a. Class IV  7. Depression.   8. GERD.  9. Anxiety.   10. Arthritis.   11. Fibromyalgia.   12. Remote right knee surgery.   13.  x 2.   14. Chronic pain syndrome.   15. Remote tobacco abuse.    16. Carpal tunnel surgery        No Known Allergies      Current Outpatient Medications:   •  amLODIPine (NORVASC) 5 MG tablet, Take 10 mg by mouth Daily., Disp: , Rfl:   •  aspirin (aspirin) 81 MG  "EC tablet, Take 81 mg by mouth Daily., Disp: , Rfl:   •  atorvastatin (LIPITOR) 80 MG tablet, Take 80 mg by mouth Daily., Disp: , Rfl:   •  clopidogrel (PLAVIX) 75 MG tablet, TAKE 1 TABLET EVERY DAY, Disp: 90 tablet, Rfl: 3  •  DOXAZOSIN MESYLATE PO, Take 8 mg by mouth Daily., Disp: , Rfl:   •  Evolocumab 140 MG/ML solution auto-injector, Inject 1 mL under the skin into the appropriate area as directed Every 14 (Fourteen) Days., Disp: 6 pen, Rfl: 3  •  ferrous sulfate 325 (65 FE) MG tablet, Take 325 mg by mouth Daily With Breakfast. Patient states she only takes 1 tablet 1-2 times per week, Disp: , Rfl:   •  furosemide (LASIX) 20 MG tablet, Take 20 mg by mouth Daily., Disp: , Rfl:   •  glipiZIDE (GLUCOTROL) 10 MG tablet, Take 10 mg by mouth 2 (Two) Times a Day Before Meals., Disp: , Rfl:   •  ISOSORBIDE DINITRATE PO, Take 30 mg by mouth Daily., Disp: , Rfl:   •  metoprolol tartrate (LOPRESSOR) 50 MG tablet, Take 1 tablet by mouth 2 (Two) Times a Day., Disp: 180 tablet, Rfl: 3  •  pantoprazole (PROTONIX) 40 MG EC tablet, Take 40 mg by mouth 2 (Two) Times a Day., Disp: , Rfl:   •  ranolazine (Ranexa) 500 MG 12 hr tablet, Take 1 tablet by mouth 2 (Two) Times a Day., Disp: 180 tablet, Rfl: 3  •  Semaglutide,0.25 or 0.5MG/DOS, (Ozempic, 0.25 or 0.5 MG/DOSE,) 2 MG/1.5ML solution pen-injector, Inject 1 mg under the skin into the appropriate area as directed 1 (One) Time Per Week., Disp: , Rfl:   •  SITagliptin (JANUVIA) 50 MG tablet, Take 50 mg by mouth 2 (two) times a day., Disp: , Rfl:   •  venlafaxine XR (EFFEXOR-XR) 150 MG 24 hr capsule, Take 450 mg by mouth Daily., Disp: , Rfl:         History of Present Illness    Patient returns today for annual follow-up of coronary artery disease.  Since her last visit she had issues with heart failure, specific nephrologist for class IV renal failure.  Multiple adjustments in the medications and patient notes that some work in some do not\".  She is not particularly compliant with " "diet or sodium intake.  Does note some orthopnea and persistent edema.  Better with diuretics however this worsens her renal function.  Patient notes he is not wearing CPAP/BiPAP    The following portions of the patient's history were reviewed and updated as appropriate: allergies, current medications, past family history, past medical history, past social history, past surgical history and problem list.      Social History     Tobacco Use   • Smoking status: Former Smoker     Quit date:      Years since quittin.8   • Smokeless tobacco: Never Used   Substance Use Topics   • Alcohol use: No   • Drug use: No         ROS       Objective:   /60   Pulse 66   Ht 152.4 cm (60\")   Wt 107 kg (236 lb)   SpO2 96%   BMI 46.09 kg/m²         Vitals reviewed.   Constitutional:       Appearance: Healthy appearance. Well-developed and not in distress.   Eyes:      Conjunctiva/sclera: Conjunctivae normal.      Pupils: Pupils are equal, round, and reactive to light.   HENT:      Head: Normocephalic and atraumatic.    Mouth/Throat:      Pharynx: Oropharynx is clear.   Neck:      Thyroid: Thyroid normal. No thyromegaly.      Vascular: Normal carotid pulses. No carotid bruit or JVD. JVD normal.      Lymphadenopathy: No cervical adenopathy.   Pulmonary:      Effort: No respiratory distress.      Breath sounds: No wheezing. No rales.   Chest:      Chest wall: Not tender to palpatation.   Cardiovascular:      Normal rate. Regular rhythm.      No gallop.   Pulses:     Carotid: 2+ bilaterally.     Dorsalis pedis: 2+ bilaterally.     Posterior tibial: 2+ bilaterally.  Edema:     Pretibial: 1+ edema of the left pretibial area and 2+ edema of the right pretibial area.  Abdominal:      General: There is no distension or abdominal bruit.      Palpations: There is no abdominal mass.      Tenderness: There is no abdominal tenderness. There is no rebound.   Musculoskeletal:         General: No tenderness or deformity.      " Extremities: No clubbing present.Skin:     General: Skin is warm and dry. There is no cyanosis.      Findings: No rash.   Neurological:      Mental Status: Alert, oriented to person, place, and time and oriented to person, place and time.         Procedures          Assessment:   Assessment/Plan      Diagnoses and all orders for this visit:    1. Coronary artery disease involving coronary bypass graft of native heart without angina pectoris (Primary)    2. Pure hypercholesterolemia    3. Essential hypertension    4. Chronic diastolic congestive heart failure (HCC)      1.  Coronary artery disease, widely patent grafts by cath last year.  Small vessel diffuse disease being treated medically  2.  Diastolic heart failure, likely due to the above, and the combination of renal failure.  3.  Hypertension currently well controlled except   4.  Dyslipidemia on high intensity statin no longer taking her injections.  Last LDL however was 60  5.  Obstructive sleep apnea.  Unclear this is being treated.  6.  Class IV renal failure    Recommendations:  1.  We will discuss with primary physician, patient may benefit from an SGL 2 inhibitor such as Jardiance or Farxiga.  2.  We will defer management of diuretics to nephrology given her class IV renal failure  3.  Get sleep study/sleep apnea treated.  4.  Discussed need to decrease sodium to be more compliant with appropriate food choices.  4.  Revisit 6 months or as needed symptom change     Erik Caal MD        Dictated utilizing Dragon dictation

## 2022-03-25 RX ORDER — CLOPIDOGREL BISULFATE 75 MG/1
75 TABLET ORAL DAILY
Qty: 90 TABLET | Refills: 1 | Status: SHIPPED | OUTPATIENT
Start: 2022-03-25

## 2022-03-25 RX ORDER — METOPROLOL TARTRATE 50 MG/1
50 TABLET, FILM COATED ORAL 2 TIMES DAILY
Qty: 180 TABLET | Refills: 1 | Status: SHIPPED | OUTPATIENT
Start: 2022-03-25

## 2022-03-25 RX ORDER — AMLODIPINE BESYLATE 5 MG/1
10 TABLET ORAL DAILY
Qty: 90 TABLET | Refills: 1 | Status: SHIPPED | OUTPATIENT
Start: 2022-03-25

## 2022-03-25 RX ORDER — ASPIRIN 81 MG/1
81 TABLET ORAL DAILY
Qty: 90 TABLET | Refills: 1 | Status: SHIPPED | OUTPATIENT
Start: 2022-03-25

## 2022-06-22 ENCOUNTER — OFFICE VISIT (OUTPATIENT)
Dept: CARDIOLOGY | Facility: CLINIC | Age: 62
End: 2022-06-22

## 2022-06-22 VITALS
SYSTOLIC BLOOD PRESSURE: 140 MMHG | WEIGHT: 225 LBS | BODY MASS INDEX: 44.17 KG/M2 | HEIGHT: 60 IN | DIASTOLIC BLOOD PRESSURE: 60 MMHG | OXYGEN SATURATION: 98 % | HEART RATE: 62 BPM

## 2022-06-22 DIAGNOSIS — I25.810 CORONARY ARTERY DISEASE INVOLVING CORONARY BYPASS GRAFT OF NATIVE HEART WITHOUT ANGINA PECTORIS: Primary | ICD-10-CM

## 2022-06-22 DIAGNOSIS — I10 ESSENTIAL HYPERTENSION: ICD-10-CM

## 2022-06-22 DIAGNOSIS — E78.00 PURE HYPERCHOLESTEROLEMIA: ICD-10-CM

## 2022-06-22 PROCEDURE — 99213 OFFICE O/P EST LOW 20 MIN: CPT | Performed by: INTERNAL MEDICINE

## 2022-06-22 NOTE — PROGRESS NOTES
Subjective:     Encounter Date:2022    Primary Care Physician: Santosh Melara DO      Patient ID: Anastasia Villalba is a 62 y.o. female.    Chief Complaint:Follow-up      PROBLEM LIST:  1. Coronary artery disease:  a. , [facility] with multivessel coronary disease, normal LVEF, cardiac catheterization by Dr. Varela.  b. 2008, coronary artery bypass grafting x 4 by Dr. Cowan, incomplete database.   c. Postoperative sternal wound infection (MRSA), status post multiple surgery debridement, as well as eventual partial sternum removal and rib removal x 4.    d. On 2014, abnormal myocardial perfusion study ready by Dr. Tripp Madrid with a small area of possible ischemia in the anterolateral wall, EF of 76%.    e. 2014, echocardiogram with normal LVEF and no significant valvular abnormalities.    f. On 2014, cardiac cath showed all widely patent grafts.  No flow-obstructive disease distal.  Normal LVEF.  g. 2018 cardiac catheterization with widely patent grafts.  Severe diffuse native vessel small vessel disease.  Not amenable to any revascularization.  EF of 50%.  h. 2020 [facility] all widely patent grafts.  Severe diffuse distal native vessel disease particularly of the circumflex and LAD not amenable to revascularization.  2. Hypertension.   a. 2020 renal arteriogram normal left renal artery.  95% medial interlobar branch of the right renal otherwise normal right renal artery.  Medical management.  3. Dyslipidemia.   4. Diabetes with neuropathy.    5. Sleep apnea  6. Renal failure  a. Class IV  7. Depression.   8. GERD.  9. Anxiety.   10. Arthritis.   11. Fibromyalgia.   12. Remote right knee surgery.   13.  x 2.   14. Chronic pain syndrome.   15. Remote tobacco abuse.    16. Carpal tunnel surgery      No Known Allergies      Current Outpatient Medications:   •  amLODIPine (NORVASC) 5 MG tablet, Take 2 tablets by mouth Daily., Disp: 90 tablet, Rfl: 1  •  aspirin  (aspirin) 81 MG EC tablet, Take 1 tablet by mouth Daily., Disp: 90 tablet, Rfl: 1  •  atorvastatin (LIPITOR) 80 MG tablet, Take 80 mg by mouth Daily., Disp: , Rfl:   •  clopidogrel (PLAVIX) 75 MG tablet, Take 1 tablet by mouth Daily., Disp: 90 tablet, Rfl: 1  •  DOXAZOSIN MESYLATE PO, Take 8 mg by mouth Daily., Disp: , Rfl:   •  ferrous sulfate 325 (65 FE) MG tablet, Take 325 mg by mouth Daily With Breakfast. Patient states she only takes 1 tablet 1-2 times per week, Disp: , Rfl:   •  furosemide (LASIX) 20 MG tablet, Take 20 mg by mouth Daily., Disp: , Rfl:   •  glipiZIDE (GLUCOTROL) 10 MG tablet, Take 10 mg by mouth 2 (Two) Times a Day Before Meals., Disp: , Rfl:   •  ISOSORBIDE DINITRATE PO, Take 30 mg by mouth Daily., Disp: , Rfl:   •  metoprolol tartrate (LOPRESSOR) 50 MG tablet, Take 1 tablet by mouth 2 (Two) Times a Day., Disp: 180 tablet, Rfl: 1  •  pantoprazole (PROTONIX) 40 MG EC tablet, Take 40 mg by mouth 2 (Two) Times a Day., Disp: , Rfl:   •  ranolazine (Ranexa) 500 MG 12 hr tablet, Take 1 tablet by mouth 2 (Two) Times a Day., Disp: 180 tablet, Rfl: 3  •  Semaglutide,0.25 or 0.5MG/DOS, (Ozempic, 0.25 or 0.5 MG/DOSE,) 2 MG/1.5ML solution pen-injector, Inject 1 mg under the skin into the appropriate area as directed 1 (One) Time Per Week., Disp: , Rfl:   •  SITagliptin (JANUVIA) 50 MG tablet, Take 50 mg by mouth 2 (two) times a day., Disp: , Rfl:   •  venlafaxine XR (EFFEXOR-XR) 150 MG 24 hr capsule, Take 450 mg by mouth Daily., Disp: , Rfl:         History of Present Illness    Patient turns for follow-up of coronary artery disease hypertension and dyslipidemia.  Since her last visit, patient overall notes no change.  Has chronic functional class III dyspnea and is limited by this as well as back pain.  Main issue recently has been blood sugars which have been elevated and she is recently started insulin.    The following portions of the patient's history were reviewed and updated as appropriate:  "allergies, current medications, past family history, past medical history, past social history, past surgical history and problem list.      Social History     Tobacco Use   • Smoking status: Former Smoker     Quit date: 2012     Years since quitting: 10.4   • Smokeless tobacco: Never Used   Substance Use Topics   • Alcohol use: No   • Drug use: No         ROS       Objective:   /60   Pulse 62   Ht 152.4 cm (60\")   Wt 102 kg (225 lb)   SpO2 98%   BMI 43.94 kg/m²         Vitals reviewed.   Constitutional:       Appearance: Well-developed and not in distress.   Neck:      Thyroid: No thyromegaly.      Vascular: No carotid bruit or JVD.   Pulmonary:      Breath sounds: Normal breath sounds.   Cardiovascular:      Regular rhythm.      No gallop. No S3 and S4 gallop.   Abdominal:      General: Bowel sounds are normal.      Palpations: Abdomen is soft. There is no abdominal mass.      Tenderness: There is no abdominal tenderness.   Musculoskeletal:         General: No deformity.      Extremities: No clubbing present.Skin:     General: Skin is warm and dry.      Findings: No rash.   Neurological:      Mental Status: Alert and oriented to person, place, and time.         Procedures          Assessment:   Assessment & Plan      Diagnoses and all orders for this visit:    1. Coronary artery disease involving coronary bypass graft of native heart without angina pectoris (Primary)    2. Essential hypertension    3. Pure hypercholesterolemia      1.  Coronary artery disease.  Previous cardiac cath showed diffuse small vessel disease not amenable to revascularization.  Chronic functional class III dyspnea/angina.  2.  Hypertension well-controlled  3.  Dyslipidemia on high intensity statin last LDL of 72.  4.  Obesity, morbid  5.  Adult onset diabetes, type II, poor control at this time.  Last hemoglobin C 8.8    Recommendations:  1.  Continue current antianginal therapy  2.  Encourage weight loss diabetes control " dietary changes.  3.  Revisit annually apparent symptom change    Erik Caal MD             Dictated utilizing Dragon dictation

## 2024-04-25 ENCOUNTER — TELEPHONE (OUTPATIENT)
Dept: CARDIOLOGY | Facility: CLINIC | Age: 64
End: 2024-04-25
Payer: MEDICARE

## 2024-04-25 NOTE — TELEPHONE ENCOUNTER
REQUEST FOR CARDIAC CLEARANCE    Caller name: Anastasia Villalba     Phone Number: [phone]    Surgeon's name: DR KALI ALLAN    Type of planned surgery: KNEE REPLACEMENT    Date of planned surgery: WAITING ON APPROVAL    Type of anesthesia: GENERAL    Have you been experiencing chest pain or shortness of breath? NO    Is your doctor requesting for you to stop any of your medications prior to your surgery? UNKNOWN    Where should we fax the clearance to? [phone] ATTN: DORIAN

## (undated) DEVICE — CATH DIAG EXPO .056 LCB 6F 100CM

## (undated) DEVICE — CATH DIAG EXPO M/ PK 6FR FL4/FR4 PIG 3PK

## (undated) DEVICE — PK CATH CARD 10

## (undated) DEVICE — CATH DIAG EXPO .045 FL3  5F 100CM

## (undated) DEVICE — MODEL BT2000 P/N 700287-012KIT CONTENTS: MANIFOLD WITH SALINE AND CONTRAST PORTS, SALINE TUBING WITH SPIKE AND HAND SYRINGE, TRANSDUCER: Brand: BT2000 AUTOMATED MANIFOLD KIT

## (undated) DEVICE — GLIDESHEATH BASIC HYDROPHILIC COATED INTRODUCER SHEATH: Brand: GLIDESHEATH

## (undated) DEVICE — CATH DIAG EXPO .056 IM 6F 100CM

## (undated) DEVICE — MODEL AT P65, P/N 701554-001KIT CONTENTS: HAND CONTROLLER, 3-WAY HIGH-PRESSURE STOPCOCK WITH ROTATING END AND PREMIUM HIGH-PRESSURE TUBING: Brand: ANGIOTOUCH® KIT

## (undated) DEVICE — CANNULA,ADULT,SOFT-TOUCH,7'TUBE,UC: Brand: PENDING

## (undated) DEVICE — Device

## (undated) DEVICE — DEV COMP RAD PRELUDESYNC 24CM

## (undated) DEVICE — CATH DIAG EXPO .056 FL3.5 6F 100CM

## (undated) DEVICE — SKIN PREP TRAY W/CHG: Brand: MEDLINE INDUSTRIES, INC.